# Patient Record
Sex: MALE | Race: WHITE | NOT HISPANIC OR LATINO | Employment: OTHER | URBAN - METROPOLITAN AREA
[De-identification: names, ages, dates, MRNs, and addresses within clinical notes are randomized per-mention and may not be internally consistent; named-entity substitution may affect disease eponyms.]

---

## 2017-06-21 PROBLEM — M54.41 BILATERAL LOW BACK PAIN WITH BILATERAL SCIATICA: Status: ACTIVE | Noted: 2017-06-21

## 2017-06-21 PROBLEM — M51.26 LUMBAR DISC HERNIATION: Status: ACTIVE | Noted: 2017-06-21

## 2017-06-21 PROBLEM — M54.42 BILATERAL LOW BACK PAIN WITH BILATERAL SCIATICA: Status: ACTIVE | Noted: 2017-06-21

## 2017-12-28 DIAGNOSIS — G43.019 INTRACTABLE MIGRAINE WITHOUT AURA AND WITHOUT STATUS MIGRAINOSUS: ICD-10-CM

## 2017-12-28 RX ORDER — SUMATRIPTAN 100 MG/1
TABLET, FILM COATED ORAL
Qty: 9 TAB | Refills: 11 | Status: SHIPPED | OUTPATIENT
Start: 2017-12-28 | End: 2018-03-16 | Stop reason: SDUPTHER

## 2018-01-04 DIAGNOSIS — G43.019 INTRACTABLE MIGRAINE WITHOUT AURA AND WITHOUT STATUS MIGRAINOSUS: ICD-10-CM

## 2018-01-04 RX ORDER — PROPRANOLOL HCL 60 MG
60 CAPSULE, EXTENDED RELEASE 24HR ORAL 2 TIMES DAILY
Qty: 60 CAP | Refills: 11 | Status: SHIPPED | OUTPATIENT
Start: 2018-01-04 | End: 2018-03-16 | Stop reason: SDUPTHER

## 2018-01-04 NOTE — TELEPHONE ENCOUNTER
Was the patient seen in the last year in this department? No (has appt kade'd in March)    Does patient have an active prescription for medications requested? Yes     Received Request Via: Pharmacy

## 2018-03-16 ENCOUNTER — OFFICE VISIT (OUTPATIENT)
Dept: NEUROLOGY | Facility: MEDICAL CENTER | Age: 33
End: 2018-03-16
Payer: COMMERCIAL

## 2018-03-16 VITALS
WEIGHT: 154.65 LBS | RESPIRATION RATE: 15 BRPM | TEMPERATURE: 96.6 F | DIASTOLIC BLOOD PRESSURE: 58 MMHG | BODY MASS INDEX: 22.14 KG/M2 | HEART RATE: 57 BPM | SYSTOLIC BLOOD PRESSURE: 100 MMHG | HEIGHT: 70 IN | OXYGEN SATURATION: 100 %

## 2018-03-16 DIAGNOSIS — G43.019 INTRACTABLE MIGRAINE WITHOUT AURA AND WITHOUT STATUS MIGRAINOSUS: Primary | ICD-10-CM

## 2018-03-16 PROCEDURE — 99213 OFFICE O/P EST LOW 20 MIN: CPT | Performed by: PSYCHIATRY & NEUROLOGY

## 2018-03-16 RX ORDER — PROPRANOLOL HCL 60 MG
60 CAPSULE, EXTENDED RELEASE 24HR ORAL DAILY
Qty: 90 CAP | Refills: 3 | Status: SHIPPED | OUTPATIENT
Start: 2018-03-16 | End: 2019-04-26 | Stop reason: SDUPTHER

## 2018-03-16 RX ORDER — SUMATRIPTAN 100 MG/1
TABLET, FILM COATED ORAL
Qty: 9 TAB | Refills: 11 | Status: SHIPPED | OUTPATIENT
Start: 2018-03-16 | End: 2019-06-19 | Stop reason: SDUPTHER

## 2018-03-16 RX ORDER — CHLORAL HYDRATE 500 MG
1000 CAPSULE ORAL
COMMUNITY
End: 2019-06-19

## 2018-03-16 RX ORDER — METOCLOPRAMIDE 10 MG/1
TABLET ORAL
Qty: 45 TAB | Refills: 1 | Status: SHIPPED | OUTPATIENT
Start: 2018-03-16 | End: 2019-06-19 | Stop reason: SDUPTHER

## 2018-03-16 ASSESSMENT — ENCOUNTER SYMPTOMS
HEADACHES: 0
WEIGHT LOSS: 1

## 2018-03-16 ASSESSMENT — PATIENT HEALTH QUESTIONNAIRE - PHQ9: CLINICAL INTERPRETATION OF PHQ2 SCORE: 0

## 2018-03-16 ASSESSMENT — PAIN SCALES - GENERAL: PAINLEVEL: NO PAIN

## 2018-03-16 NOTE — PROGRESS NOTES
"Subjective:      Mesfin Beth is a 33 y.o. male who presents for follow-up, with a history of migraine without aura.     HPI    Amazingly, Mesfin's headaches have improved even further than when last seen. This was triggered when he was told that his lumbar spine may require surgery. She had undergone a rather significant rehabilitation following an acute injury, imaging revealing a degenerative process at L5/S1. He was given the option of changing his lifestyle, losing weight, etc., or undergoing surgery with fusion. He opted for the former, lost the weight, is more active, and in fact the headaches have improved during the same time.    On Inderal LA 60 mg, twice a day, he went to a daily dose for one month, then off completely, but the headaches then began to increase within less than a week. He is back on Inderal LA 60 mg daily over the last 3 months, he has reached for Imitrex rescue maybe 4 times in total, he uses Reglan even less frequently. Milder headaches are treated with Aleve OTC with good benefit. In general he feels much better. He tolerates the propranolol without issues of weight gain, loss of endurance, edema, etc.    Medical, surgical and family histories are reviewed, there are no new drug allergies. 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 He is quite upbeat about this. He is on Inderal LA 60 mg daily, Imitrex 100 mg, often using either a 25 mg or 50 mg dose, Reglan 10 mg when necessary, Aleve OTC when necessary, the rest as per the electronic health record, noncontributory from my standpoint.    Review of Systems   Constitutional: Positive for weight loss. Negative for malaise/fatigue.   Cardiovascular: Negative for leg swelling.   Neurological: Negative for headaches.   All other systems reviewed and are negative.       Objective:     /58   Pulse (!) 57   Temp 35.9 °C (96.6 °F)   Resp 15   Ht 1.778 m (5' 10\")   Wt 70.1 kg (154 lb 10.4 oz)  "  SpO2 100%   BMI 22.19 kg/m²      Physical Exam    He appears in no acute distress, vital signs are stable though his pulse is still low at a sinus bradycardia of 57. There is no malar rash, temporal jaw tenderness, or jaw claudication. Carotid pulses are present bilaterally without asymmetry. There is no lower extremity edema.    Including mental status, cranial nerves, motor, reflexes, coordination, and sensory evaluations, the examination is fully intact.     Assessment/Plan:     1. Intractable migraine without aura and without status migrainosus  Things are doing very well, we will continue him on Inderal LA 60 mg daily for now. To the future, he can always try to cut the drug out completely, but I recommended he try to stick it out for more than just one week to see if things settle down spontaneously. Taking the drug long-term is certainly safe to do. He will use the Imitrex 100 mg tablets, cutting them into fractions allowing him to use a 25 mg or 50 mg dosing instead. We will follow-up in one year otherwise. Phone contact in the interim if needed.     propranolol LA (INDERAL LA) 60 MG CAPSULE SR 24 HR; Take 1 Cap by mouth every day.  Dispense: 90 Cap; Refill: 3  - sumatriptan (IMITREX) 100 MG tablet; 1 tab at headache onset; repeat in 1 hour prn  Dispense: 9 Tab; Refill: 11  - metoclopramide (REGLAN) 10 MG Tab; 1-3 tab at headache/nausea onset; repeat in 4-6 hours prn  Dispense: 45 Tab; Refill: 1    Time: Evaluation of 20 minutes for exam: Review, discussion, and education  Discussion: As mentioned in the assessment, over 60% of the time spent face-to-face counseling and coordinating care

## 2018-05-21 ENCOUNTER — OFFICE VISIT (OUTPATIENT)
Dept: URGENT CARE | Facility: CLINIC | Age: 33
End: 2018-05-21
Payer: COMMERCIAL

## 2018-05-21 VITALS
DIASTOLIC BLOOD PRESSURE: 72 MMHG | RESPIRATION RATE: 18 BRPM | TEMPERATURE: 98.2 F | WEIGHT: 159 LBS | HEIGHT: 70 IN | SYSTOLIC BLOOD PRESSURE: 120 MMHG | HEART RATE: 58 BPM | BODY MASS INDEX: 22.76 KG/M2 | OXYGEN SATURATION: 96 %

## 2018-05-21 DIAGNOSIS — R19.7 DIARRHEA, UNSPECIFIED TYPE: ICD-10-CM

## 2018-05-21 DIAGNOSIS — R10.9 ABDOMINAL DISCOMFORT: ICD-10-CM

## 2018-05-21 DIAGNOSIS — K92.1 FRANK BLOOD IN STOOL: ICD-10-CM

## 2018-05-21 PROCEDURE — 99204 OFFICE O/P NEW MOD 45 MIN: CPT | Performed by: NURSE PRACTITIONER

## 2018-05-21 ASSESSMENT — ENCOUNTER SYMPTOMS
NAUSEA: 1
VOMITING: 0
DIARRHEA: 1
CONSTIPATION: 0
FEVER: 0
FLATUS: 0
ABDOMINAL PAIN: 1

## 2018-05-21 ASSESSMENT — CROHNS DISEASE ACTIVITY INDEX (CDAI): CDAI SCORE: 0

## 2018-05-21 NOTE — PROGRESS NOTES
Subjective:      Mesfin Beth is a 33 y.o. male who presents with Abdominal Pain (Yesterday nauseas , vomitng, diarrhea and abdominal pain , today rectal bleeding .)            Abdominal Pain   This is a new problem. Episode onset: pt reports he developed sudden onset of nausea and upset stomach early morning yesterday. He admits he woke suddenly with upset stomach, nausea and diarrhea. States he did not feel very hungry yesterday or this morning. The onset quality is sudden. The pain is located in the generalized abdominal region. The pain is mild. Associated symptoms include diarrhea and nausea. Pertinent negatives include no constipation, fever, flatus or vomiting. Associated symptoms comments: Pt reports today he had another episode of diarrhea but this diarrhea had what looked to be a large amount of bright red blood. He became very concerned when he saw all the blood. Describes the abdominal pain as generalized discomfort, denies any local area of pain. Nothing aggravates the pain. He has tried nothing for the symptoms. There is no history of Crohn's disease, irritable bowel syndrome or ulcerative colitis.       Review of Systems   Constitutional: Negative for fever.   Gastrointestinal: Positive for abdominal pain, diarrhea and nausea. Negative for constipation, flatus and vomiting.   All other systems reviewed and are negative.    Past Medical History:   Diagnosis Date   • Head ache    • Migraine without aura, with intractable migraine, so stated, without mention of status migrainosus       Past Surgical History:   Procedure Laterality Date   • APPENDECTOMY      2008      Social History     Social History   • Marital status:      Spouse name: N/A   • Number of children: N/A   • Years of education: N/A     Occupational History   • Not on file.     Social History Main Topics   • Smoking status: Former Smoker     Years: 2.00     Types: Cigarettes     Quit date: 1/1/2005   • Smokeless tobacco: Never Used  "  • Alcohol use 0.5 oz/week     1 Glasses of wine per week      Comment: 1 drink per week, wine usually   • Drug use: No   • Sexual activity: Yes     Partners: Female     Other Topics Concern   • Not on file     Social History Narrative   • No narrative on file          Objective:     /72   Pulse (!) 58   Temp 36.8 °C (98.2 °F)   Resp 18   Ht 1.778 m (5' 10\")   Wt 72.1 kg (159 lb)   SpO2 96%   BMI 22.81 kg/m²      Physical Exam   Constitutional: He is oriented to person, place, and time. Vital signs are normal. He appears well-developed and well-nourished.   HENT:   Head: Normocephalic and atraumatic.   Eyes: EOM are normal. Pupils are equal, round, and reactive to light.   Neck: Normal range of motion.   Cardiovascular: Normal rate and regular rhythm.    Pulmonary/Chest: Effort normal.   Abdominal: Soft. Normal appearance and bowel sounds are normal. He exhibits no distension. There is no tenderness. There is no rigidity, no rebound and no guarding.   Musculoskeletal: Normal range of motion.   Neurological: He is alert and oriented to person, place, and time.   Skin: Skin is warm and dry. Capillary refill takes less than 2 seconds.   Psychiatric: He has a normal mood and affect. His behavior is normal. Thought content normal.   Vitals reviewed.              Assessment/Plan:     1. Diarrhea, unspecified type    2. Braulio blood in stool    3. Abdominal discomfort    Discussed with patient the source of his diarrhea could be from what he ate the first night of symptoms or viral in nature.   Vitals are stable, non toxic appearing  The source of blood could be from internal hemorrhoid(s) or colonic inflammation due to diarrhea. Since he has had only one episode of diarrhea with blood, I am encouraging him to go home and start on a clear liquid diet to help reduce inflammation in his intestinal tract and improve diarrhea, likely stop bleeding. The presence of blood seems unlikely that it is stemming from " ulcerative colitis, diverticulitis or potentially colonic cancer. These are discussed with patient and family they understand  Advised to monitor stool and assess for continued presence of blood  If blood produced remains copious or if he develops abdominal pain, he has strict instructions to go to the ED  All questions answered, encouraged to follow up with PCP in one week  Supportive care, differential diagnoses, and indications for immediate follow-up discussed with patient.    Pathogenesis of diagnosis discussed including typical length and natural progression.      Instructed to return to UC or nearest emergency department if symptoms fail to improve, for any change in condition, further concerns, or new concerning symptoms.  Patient states understanding of the plan of care and discharge instructions.

## 2018-06-27 ENCOUNTER — HOSPITAL ENCOUNTER (OUTPATIENT)
Dept: LAB | Facility: MEDICAL CENTER | Age: 33
End: 2018-06-27
Payer: COMMERCIAL

## 2018-06-27 LAB
BDY FAT % MEASURED: 8.4 %
BP DIAS: 69 MMHG
BP SYS: 125 MMHG
CHOLEST SERPL-MCNC: 208 MG/DL (ref 100–199)
DIABETES HTDIA: NO
EVENT NAME HTEVT: NORMAL
FASTING HTFAS: YES
GLUCOSE SERPL-MCNC: 77 MG/DL (ref 65–99)
HDLC SERPL-MCNC: 57 MG/DL
HYPERTENSION HTHYP: NO
LDLC SERPL CALC-MCNC: 132 MG/DL
SCREENING LOC CITY HTCIT: NORMAL
SCREENING LOC STATE HTSTA: NORMAL
SCREENING LOCATION HTLOC: NORMAL
SMOKING HTSMO: NO
SUBSCRIBER ID HTSID: NORMAL
TRIGL SERPL-MCNC: 93 MG/DL (ref 0–149)

## 2018-06-27 PROCEDURE — 80061 LIPID PANEL: CPT

## 2018-06-27 PROCEDURE — 82947 ASSAY GLUCOSE BLOOD QUANT: CPT

## 2018-06-27 PROCEDURE — S5190 WELLNESS ASSESSMENT BY NONPH: HCPCS

## 2018-06-27 PROCEDURE — 36415 COLL VENOUS BLD VENIPUNCTURE: CPT

## 2019-03-18 ENCOUNTER — APPOINTMENT (OUTPATIENT)
Dept: NEUROLOGY | Facility: MEDICAL CENTER | Age: 34
End: 2019-03-18
Payer: COMMERCIAL

## 2019-04-26 DIAGNOSIS — G43.019 INTRACTABLE MIGRAINE WITHOUT AURA AND WITHOUT STATUS MIGRAINOSUS: ICD-10-CM

## 2019-04-26 RX ORDER — PROPRANOLOL HCL 60 MG
60 CAPSULE, EXTENDED RELEASE 24HR ORAL DAILY
Qty: 90 CAP | Refills: 3 | Status: SHIPPED | OUTPATIENT
Start: 2019-04-26 | End: 2020-05-28 | Stop reason: SDUPTHER

## 2019-06-19 ENCOUNTER — OFFICE VISIT (OUTPATIENT)
Dept: NEUROLOGY | Facility: MEDICAL CENTER | Age: 34
End: 2019-06-19
Payer: COMMERCIAL

## 2019-06-19 VITALS
RESPIRATION RATE: 15 BRPM | TEMPERATURE: 97.2 F | OXYGEN SATURATION: 98 % | SYSTOLIC BLOOD PRESSURE: 130 MMHG | DIASTOLIC BLOOD PRESSURE: 78 MMHG | HEIGHT: 70 IN | WEIGHT: 169 LBS | BODY MASS INDEX: 24.2 KG/M2 | HEART RATE: 69 BPM

## 2019-06-19 DIAGNOSIS — G43.019 INTRACTABLE MIGRAINE WITHOUT AURA AND WITHOUT STATUS MIGRAINOSUS: Primary | ICD-10-CM

## 2019-06-19 PROCEDURE — 99213 OFFICE O/P EST LOW 20 MIN: CPT | Performed by: PSYCHIATRY & NEUROLOGY

## 2019-06-19 RX ORDER — METOCLOPRAMIDE 10 MG/1
TABLET ORAL
Qty: 45 TAB | Refills: 1 | Status: SHIPPED | OUTPATIENT
Start: 2019-06-19 | End: 2020-05-28 | Stop reason: SDUPTHER

## 2019-06-19 RX ORDER — SUMATRIPTAN 100 MG/1
TABLET, FILM COATED ORAL
Qty: 9 TAB | Refills: 11 | Status: SHIPPED | OUTPATIENT
Start: 2019-06-19 | End: 2020-05-28 | Stop reason: SDUPTHER

## 2019-06-19 ASSESSMENT — PAIN SCALES - GENERAL: PAINLEVEL: NO PAIN

## 2019-06-19 ASSESSMENT — ENCOUNTER SYMPTOMS
TINGLING: 0
FOCAL WEAKNESS: 0
TREMORS: 0
BACK PAIN: 1

## 2019-06-19 ASSESSMENT — PATIENT HEALTH QUESTIONNAIRE - PHQ9: CLINICAL INTERPRETATION OF PHQ2 SCORE: 0

## 2019-06-19 NOTE — PROGRESS NOTES
"Subjective:      Mesfin Beth is a 34 y.o. male who presents for follow-up, with a history of migraine without aura.     HPI    Still on Inderal LA 60 mg daily, he has a migraine attack once a month.  Milder headaches might occur more frequently in the mornings, a simple cup of coffee does the trick.  The headaches are still responsive to the Imitrex 100 mg tablets, occasional use with Reglan 10 mg.    The Inderal is well-tolerated, he has had no problems with lowered endurance, edema, dizziness or syncope.  He is asking again about possibly getting off the Inderal to see if things get worse, as it had in the past when he tried.    The low back pain issues have stabilized, he did undergo surgery and is simply limited because of exercise type that he must avoid, specifically running.  He is otherwise quite physically active with low impact aerobics and weightlifting.    Medical, surgical and family histories are reviewed in the electronic health record, there are no new drug allergies.  He is on Inderal LA 60 mg daily, Imitrex 100 mg/Reglan 10 mg as needed, and has Flexeril 10 mg as needed.    Review of Systems   Cardiovascular: Negative for leg swelling.   Musculoskeletal: Positive for back pain.   Neurological: Negative for tingling, tremors and focal weakness.   All other systems reviewed and are negative.       Objective:     /78 (BP Location: Right arm, Patient Position: Sitting)   Pulse 69   Temp 36.2 °C (97.2 °F) (Temporal)   Resp 15   Ht 1.778 m (5' 10\")   Wt 76.7 kg (169 lb)   SpO2 98%   BMI 24.25 kg/m²      Physical Exam    He appears in no acute distress.  His vital signs are stable.  There is no malar rash or jaw claudication.  The neck is supple, range of motion is full.  Cardiac evaluation is unremarkable.  There is no lower extremity edema.    Including mental status, cranial nerve, musculoskeletal and coordination evaluations, the examination reveals no evidence of deficits nor " toxicities.     Assessment/Plan:     1. Intractable migraine without aura and without status migrainosus  Stabilizing nicely, though I suspect his headaches will get worse again off Inderal, I did welcome him to try.  Though he could attempt simply discontinuing the drug, he did so in the past without rebound tachycardia, he will go every other day for several weeks, allowing enough time to transpire, given his headache frequency is now once a month.  If things are stable, he stays off the drug, if the headaches increase, he gets back on.  He is comfortable with this.  I again reassured him that taking the drug long-term is safe to do.  We will follow-up in 1 year.    - sumatriptan (IMITREX) 100 MG tablet; 1 tab at headache onset; repeat in 1 hour prn  Dispense: 9 Tab; Refill: 11  - metoclopramide (REGLAN) 10 MG Tab; 1-3 tab at headache/nausea onset; repeat in 4-6 hours prn  Dispense: 45 Tab; Refill: 1    Time: 20 minutes spent face-to-face for exam, review, discussion, and education, of this over 50% of the time spent counseling and coordinating care.

## 2019-08-02 ENCOUNTER — HOSPITAL ENCOUNTER (OUTPATIENT)
Dept: LAB | Facility: MEDICAL CENTER | Age: 34
End: 2019-08-02
Payer: COMMERCIAL

## 2019-08-02 LAB
BDY FAT % MEASURED: 14.1 %
BP DIAS: 79 MMHG
BP SYS: 127 MMHG
CHOLEST SERPL-MCNC: 203 MG/DL (ref 100–199)
DIABETES HTDIA: NO
EVENT NAME HTEVT: NORMAL
FASTING HTFAS: YES
GLUCOSE SERPL-MCNC: 100 MG/DL (ref 65–99)
HDLC SERPL-MCNC: 51 MG/DL
HYPERTENSION HTHYP: NO
LDLC SERPL CALC-MCNC: 131 MG/DL
SCREENING LOC CITY HTCIT: NORMAL
SCREENING LOC STATE HTSTA: NORMAL
SCREENING LOCATION HTLOC: NORMAL
SMOKING HTSMO: NO
SUBSCRIBER ID HTSID: NORMAL
TRIGL SERPL-MCNC: 103 MG/DL (ref 0–149)

## 2019-08-02 PROCEDURE — 82947 ASSAY GLUCOSE BLOOD QUANT: CPT

## 2019-08-02 PROCEDURE — 80061 LIPID PANEL: CPT

## 2019-08-02 PROCEDURE — 36415 COLL VENOUS BLD VENIPUNCTURE: CPT

## 2019-08-02 PROCEDURE — S5190 WELLNESS ASSESSMENT BY NONPH: HCPCS

## 2019-10-19 ENCOUNTER — NON-PROVIDER VISIT (OUTPATIENT)
Dept: URGENT CARE | Facility: CLINIC | Age: 34
End: 2019-10-19
Payer: COMMERCIAL

## 2019-10-19 DIAGNOSIS — Z23 NEED FOR INFLUENZA VACCINATION: ICD-10-CM

## 2019-10-19 PROCEDURE — 90686 IIV4 VACC NO PRSV 0.5 ML IM: CPT | Performed by: FAMILY MEDICINE

## 2019-10-19 PROCEDURE — 90471 IMMUNIZATION ADMIN: CPT | Performed by: FAMILY MEDICINE

## 2020-05-28 ENCOUNTER — OFFICE VISIT (OUTPATIENT)
Dept: NEUROLOGY | Facility: MEDICAL CENTER | Age: 35
End: 2020-05-28
Payer: COMMERCIAL

## 2020-05-28 VITALS
HEIGHT: 70 IN | TEMPERATURE: 98.2 F | RESPIRATION RATE: 18 BRPM | DIASTOLIC BLOOD PRESSURE: 78 MMHG | HEART RATE: 64 BPM | BODY MASS INDEX: 23.19 KG/M2 | SYSTOLIC BLOOD PRESSURE: 126 MMHG | WEIGHT: 162 LBS | OXYGEN SATURATION: 99 %

## 2020-05-28 DIAGNOSIS — G43.019 INTRACTABLE MIGRAINE WITHOUT AURA AND WITHOUT STATUS MIGRAINOSUS: Primary | ICD-10-CM

## 2020-05-28 PROCEDURE — 99213 OFFICE O/P EST LOW 20 MIN: CPT | Performed by: PSYCHIATRY & NEUROLOGY

## 2020-05-28 RX ORDER — SUMATRIPTAN 100 MG/1
TABLET, FILM COATED ORAL
Qty: 9 TAB | Refills: 11 | Status: SHIPPED | OUTPATIENT
Start: 2020-05-28 | End: 2022-04-01

## 2020-05-28 RX ORDER — METOCLOPRAMIDE 10 MG/1
TABLET ORAL
Qty: 45 TAB | Refills: 1 | Status: SHIPPED | OUTPATIENT
Start: 2020-05-28

## 2020-05-28 RX ORDER — PROPRANOLOL HCL 60 MG
60 CAPSULE, EXTENDED RELEASE 24HR ORAL DAILY
Qty: 90 CAP | Refills: 3 | Status: SHIPPED | OUTPATIENT
Start: 2020-05-28 | End: 2021-05-17

## 2020-05-28 ASSESSMENT — ENCOUNTER SYMPTOMS
HEADACHES: 1
MEMORY LOSS: 0

## 2020-05-28 ASSESSMENT — PATIENT HEALTH QUESTIONNAIRE - PHQ9: CLINICAL INTERPRETATION OF PHQ2 SCORE: 0

## 2020-05-28 NOTE — PROGRESS NOTES
Subjective:      Mesfin Beth is a 35 y.o. male who presents for routine annual follow-up, with a history of migraine without aura.    HPI    Over the last year, Mesfin states the headaches have remained well controlled.  He will still get a breakthrough event once or twice in a month, Imitrex 100 mg with Reglan 10 mg always does the trick on a consistent basis.  He was still get the occasional milder headaches in the mornings for which a cup of coffee does the trick.  There does not seem to be a relationship with these occurring greater frequency or severity leading up to a migraine.    We had discussed trying again, so when last seen, he stopped Inderal LA 60 mg cold turkey.  He had done this in the past without rebound, but this time it may have been a problem.  He felt horrible, described malaise and fatigue, but also the headaches came back.  He is back on the drug.  He is always tolerated the drug, he simply does not like taking pills.  Historically, he had been on a 120 mg dose, was able to get down to 60 without issue, but repeated attempts at doing further never went anywhere.    Medical, surgical and family history is reviewed, there are no new drug allergies.  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  He is slowly pushing this product though with COVID-19, closure of bars and liquor stores, cells are on hold.  Jessa, his wife, is doing well as a nurse.  He is on Inderal LA 60 mg daily, Reglan 10 mg/Imitrex 100 mg as needed, and some vitamin and mineral supplements with probiotics.    Review of Systems   Neurological: Positive for headaches.   Psychiatric/Behavioral: Negative for memory loss.   All other systems reviewed and are negative.        Objective:     /78 (BP Location: Left arm, Patient Position: Sitting, BP Cuff Size: Adult)   Pulse 64   Temp 36.8 °C (98.2 °F) (Temporal)   Resp 18   Ht  "1.778 m (5' 10\")   Wt 73.5 kg (162 lb)   SpO2 99%   BMI 23.24 kg/m²      Physical Exam    He appears in no acute distress.  Vital signs are stable.  There is no malar rash, jaw claudication, or allodynia.  The neck is supple, range of motion is full.  Cardiac evaluation is unremarkable.  There is no lower extremity edema.    Including mental status, cranial nerves, musculoskeletal, reflex, coordination, and since evaluations, full neurologic examination is done reveals no evidence of deficits or toxicities.    Assessment/Plan:     1. Intractable migraine without aura and without status migrainosus  He will likely need to stay on Inderal LA 60 mg dose for quite some time, but if he tries again, I recommended going more slowly and incrementally which may allow a reduction in overall dose before headaches actually recur.  When he is ready, I recommended going to Inderal 20 mg tablets, 1 tablet twice daily for 2 weeks, then 1 tablet daily for another 2 weeks, eventually stopping.  The rationale for this was reviewed.  In the interim, he will maintain his present regimen getting through this pandemic and allowing things to stabilize vis-a-vis his personal business.  We will follow-up in 1 year.    - propranolol LA (INDERAL LA) 60 MG CAPSULE SR 24 HR; Take 1 Cap by mouth every day.  Dispense: 90 Cap; Refill: 3  - sumatriptan (IMITREX) 100 MG tablet; 1 tab at headache onset; repeat in 1 hour prn  Dispense: 9 Tab; Refill: 11  - metoclopramide (REGLAN) 10 MG Tab; 1-3 tab at headache/nausea onset; repeat in 4-6 hours prn  Dispense: 45 Tab; Refill: 1    Time: 20-minute spent face-to-face for exam, review, discussion, and education, of this over 50% of the time spent counseling and coordinating care.  "

## 2020-08-07 ENCOUNTER — HOSPITAL ENCOUNTER (OUTPATIENT)
Dept: LAB | Facility: MEDICAL CENTER | Age: 35
End: 2020-08-07
Payer: COMMERCIAL

## 2020-08-07 LAB
BDY FAT % MEASURED: 19.2 %
BP DIAS: 84 MMHG
BP SYS: 122 MMHG
CHOLEST SERPL-MCNC: 211 MG/DL (ref 100–199)
DIABETES HTDIA: NO
EVENT NAME HTEVT: NORMAL
FASTING HTFAS: YES
GLUCOSE SERPL-MCNC: 93 MG/DL (ref 65–99)
HDLC SERPL-MCNC: 59 MG/DL
HYPERTENSION HTHYP: NO
LDLC SERPL CALC-MCNC: 131 MG/DL
SCREENING LOC CITY HTCIT: NORMAL
SCREENING LOC STATE HTSTA: NORMAL
SCREENING LOCATION HTLOC: NORMAL
SMOKING HTSMO: NO
SUBSCRIBER ID HTSID: NORMAL
TRIGL SERPL-MCNC: 107 MG/DL (ref 0–149)

## 2020-08-07 PROCEDURE — 36415 COLL VENOUS BLD VENIPUNCTURE: CPT

## 2020-08-07 PROCEDURE — 80061 LIPID PANEL: CPT

## 2020-08-07 PROCEDURE — S5190 WELLNESS ASSESSMENT BY NONPH: HCPCS

## 2020-08-07 PROCEDURE — 82947 ASSAY GLUCOSE BLOOD QUANT: CPT

## 2021-05-16 DIAGNOSIS — G43.019 INTRACTABLE MIGRAINE WITHOUT AURA AND WITHOUT STATUS MIGRAINOSUS: ICD-10-CM

## 2021-05-17 RX ORDER — PROPRANOLOL HCL 60 MG
CAPSULE, EXTENDED RELEASE 24HR ORAL
Qty: 90 CAPSULE | Refills: 3 | Status: SHIPPED | OUTPATIENT
Start: 2021-05-17 | End: 2022-05-09

## 2022-04-01 ENCOUNTER — OFFICE VISIT (OUTPATIENT)
Dept: NEUROLOGY | Facility: MEDICAL CENTER | Age: 37
End: 2022-04-01
Attending: PSYCHIATRY & NEUROLOGY
Payer: COMMERCIAL

## 2022-04-01 VITALS
DIASTOLIC BLOOD PRESSURE: 90 MMHG | BODY MASS INDEX: 26.51 KG/M2 | OXYGEN SATURATION: 96 % | HEIGHT: 70 IN | HEART RATE: 68 BPM | TEMPERATURE: 97.6 F | WEIGHT: 185.19 LBS | SYSTOLIC BLOOD PRESSURE: 120 MMHG

## 2022-04-01 DIAGNOSIS — G43.019 INTRACTABLE MIGRAINE WITHOUT AURA AND WITHOUT STATUS MIGRAINOSUS: Primary | ICD-10-CM

## 2022-04-01 PROCEDURE — 99213 OFFICE O/P EST LOW 20 MIN: CPT | Performed by: PSYCHIATRY & NEUROLOGY

## 2022-04-01 PROCEDURE — 99212 OFFICE O/P EST SF 10 MIN: CPT | Performed by: PSYCHIATRY & NEUROLOGY

## 2022-04-01 RX ORDER — SUMATRIPTAN 50 MG/1
TABLET, FILM COATED ORAL
Qty: 9 TABLET | Refills: 0 | Status: SHIPPED | OUTPATIENT
Start: 2022-04-01 | End: 2022-05-09

## 2022-04-01 ASSESSMENT — PATIENT HEALTH QUESTIONNAIRE - PHQ9: CLINICAL INTERPRETATION OF PHQ2 SCORE: 0

## 2022-04-02 ASSESSMENT — ENCOUNTER SYMPTOMS
HEADACHES: 1
NECK PAIN: 1

## 2022-04-02 NOTE — PROGRESS NOTES
"Subjective     Mesfin Beth is a 37 y.o. male who presents for follow-up, with a history of migraine without aura.    HPI    Since last seen, Mesfin states that the headaches have increased somewhat.  This started back in September, 2021, after he went through a rather rough course with Covid-19.  Prior to that he was having a headache maybe once in a month if not less, now he is getting up 4 times in a month.  To begin mostly with increasing neck stiffness, using Imitrex 100 mg does take everything away consistently.  Is simply having to reach for the drug little more often.  The 100 mg dose does increase some of his neck stiffness that comes with the headaches, though it does work.  He is wondering if a lower dose might be effective without the side effect.  He has remained on Inderal LA 60 mg daily.    Medical, surgical and family histories are reviewed, there are no new drug allergies.  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  He is on Inderal LA 60 mg daily, Imitrex 100 mg as needed, Reglan 10 mg as needed, Flexeril 10 mg 3 times daily as needed, along with vitamin C and D supplements.    Review of Systems   Musculoskeletal: Positive for neck pain.   Neurological: Positive for headaches.   All other systems reviewed and are negative.    Objective     /90 (BP Location: Right arm, Patient Position: Sitting, BP Cuff Size: Adult)   Pulse 68   Temp 36.4 °C (97.6 °F) (Temporal)   Ht 1.778 m (5' 10\")   Wt 84 kg (185 lb 3 oz)   SpO2 96%   BMI 26.57 kg/m²      Physical Exam    He appears in no acute distress.  His vital signs are stable.  Blood pressure is slightly elevated at 120/90.  There is no malar rash or jaw claudication.  His neck is supple.  Cardiac evaluation reveals a regular rhythm.  There is no lower extremity edema.     Neurological Exam    Including mental status, cranial nerve, musculoskeletal and coordination valuations, his exam remains intact.    Assessment & Plan "     1. Intractable migraine without aura and without status migrainosus  For now we maintain the course on Inderal LA 60 mg daily.  He needs the drug, higher doses were never tolerated.  I will give him an Imitrex 50 mg tablets to try instead of the 100 mg tablets.  He will let us know how he is doing with the lower dose in terms of efficacy and tolerability.  Though we talked about adding another maintenance therapy, including the CGRP group, he would like to hold off for now.  We will see each other in 6 months.    - SUMAtriptan (IMITREX) 50 MG Tab; 1 tab at headache onset; repeat in 1 hour prn  Dispense: 9 Tablet; Refill: 0    Time: 20 minutes in total spent on patient care including free charting, record review, discussion with healthcare staff and documentation.  This includes face-to-face time with the patient for exam, review, discussion, as well as counseling and coordinating care.

## 2022-05-08 DIAGNOSIS — G43.019 INTRACTABLE MIGRAINE WITHOUT AURA AND WITHOUT STATUS MIGRAINOSUS: ICD-10-CM

## 2022-05-09 RX ORDER — PROPRANOLOL HCL 60 MG
CAPSULE, EXTENDED RELEASE 24HR ORAL
Qty: 90 CAPSULE | Refills: 3 | Status: SHIPPED | OUTPATIENT
Start: 2022-05-09 | End: 2023-05-23

## 2022-05-09 NOTE — TELEPHONE ENCOUNTER
Received request via: Pharmacy    Was the patient seen in the last year in this department? Yes    LV: 4/1/22    Does the patient have an active prescription (recently filled or refills available) for medication(s) requested? Yes.

## 2022-11-04 DIAGNOSIS — G43.019 INTRACTABLE MIGRAINE WITHOUT AURA AND WITHOUT STATUS MIGRAINOSUS: ICD-10-CM

## 2022-11-04 RX ORDER — SUMATRIPTAN 50 MG/1
TABLET, FILM COATED ORAL
Qty: 9 TABLET | Refills: 5 | Status: SHIPPED | OUTPATIENT
Start: 2022-11-04 | End: 2023-08-14

## 2023-04-06 SDOH — ECONOMIC STABILITY: INCOME INSECURITY: IN THE LAST 12 MONTHS, WAS THERE A TIME WHEN YOU WERE NOT ABLE TO PAY THE MORTGAGE OR RENT ON TIME?: NO

## 2023-04-06 SDOH — ECONOMIC STABILITY: HOUSING INSECURITY
IN THE LAST 12 MONTHS, WAS THERE A TIME WHEN YOU DID NOT HAVE A STEADY PLACE TO SLEEP OR SLEPT IN A SHELTER (INCLUDING NOW)?: NO

## 2023-04-06 SDOH — ECONOMIC STABILITY: TRANSPORTATION INSECURITY
IN THE PAST 12 MONTHS, HAS LACK OF TRANSPORTATION KEPT YOU FROM MEETINGS, WORK, OR FROM GETTING THINGS NEEDED FOR DAILY LIVING?: NO

## 2023-04-06 SDOH — ECONOMIC STABILITY: INCOME INSECURITY: HOW HARD IS IT FOR YOU TO PAY FOR THE VERY BASICS LIKE FOOD, HOUSING, MEDICAL CARE, AND HEATING?: NOT HARD AT ALL

## 2023-04-06 SDOH — ECONOMIC STABILITY: FOOD INSECURITY: WITHIN THE PAST 12 MONTHS, YOU WORRIED THAT YOUR FOOD WOULD RUN OUT BEFORE YOU GOT MONEY TO BUY MORE.: NEVER TRUE

## 2023-04-06 SDOH — ECONOMIC STABILITY: HOUSING INSECURITY: IN THE LAST 12 MONTHS, HOW MANY PLACES HAVE YOU LIVED?: 1

## 2023-04-06 SDOH — HEALTH STABILITY: PHYSICAL HEALTH: ON AVERAGE, HOW MANY DAYS PER WEEK DO YOU ENGAGE IN MODERATE TO STRENUOUS EXERCISE (LIKE A BRISK WALK)?: 7 DAYS

## 2023-04-06 SDOH — ECONOMIC STABILITY: TRANSPORTATION INSECURITY
IN THE PAST 12 MONTHS, HAS THE LACK OF TRANSPORTATION KEPT YOU FROM MEDICAL APPOINTMENTS OR FROM GETTING MEDICATIONS?: NO

## 2023-04-06 SDOH — HEALTH STABILITY: PHYSICAL HEALTH: ON AVERAGE, HOW MANY MINUTES DO YOU ENGAGE IN EXERCISE AT THIS LEVEL?: 30 MIN

## 2023-04-06 SDOH — ECONOMIC STABILITY: FOOD INSECURITY: WITHIN THE PAST 12 MONTHS, THE FOOD YOU BOUGHT JUST DIDN'T LAST AND YOU DIDN'T HAVE MONEY TO GET MORE.: NEVER TRUE

## 2023-04-06 SDOH — ECONOMIC STABILITY: TRANSPORTATION INSECURITY
IN THE PAST 12 MONTHS, HAS LACK OF RELIABLE TRANSPORTATION KEPT YOU FROM MEDICAL APPOINTMENTS, MEETINGS, WORK OR FROM GETTING THINGS NEEDED FOR DAILY LIVING?: NO

## 2023-04-06 SDOH — HEALTH STABILITY: MENTAL HEALTH
STRESS IS WHEN SOMEONE FEELS TENSE, NERVOUS, ANXIOUS, OR CAN'T SLEEP AT NIGHT BECAUSE THEIR MIND IS TROUBLED. HOW STRESSED ARE YOU?: NOT AT ALL

## 2023-04-06 ASSESSMENT — SOCIAL DETERMINANTS OF HEALTH (SDOH)
WITHIN THE PAST 12 MONTHS, YOU WORRIED THAT YOUR FOOD WOULD RUN OUT BEFORE YOU GOT THE MONEY TO BUY MORE: NEVER TRUE
HOW OFTEN DO YOU HAVE A DRINK CONTAINING ALCOHOL: 2-4 TIMES A MONTH
HOW OFTEN DO YOU ATTENT MEETINGS OF THE CLUB OR ORGANIZATION YOU BELONG TO?: NEVER
HOW HARD IS IT FOR YOU TO PAY FOR THE VERY BASICS LIKE FOOD, HOUSING, MEDICAL CARE, AND HEATING?: NOT HARD AT ALL
HOW OFTEN DO YOU GET TOGETHER WITH FRIENDS OR RELATIVES?: ONCE A WEEK
HOW OFTEN DO YOU GET TOGETHER WITH FRIENDS OR RELATIVES?: ONCE A WEEK
IN A TYPICAL WEEK, HOW MANY TIMES DO YOU TALK ON THE PHONE WITH FAMILY, FRIENDS, OR NEIGHBORS?: MORE THAN THREE TIMES A WEEK
IN A TYPICAL WEEK, HOW MANY TIMES DO YOU TALK ON THE PHONE WITH FAMILY, FRIENDS, OR NEIGHBORS?: MORE THAN THREE TIMES A WEEK
DO YOU BELONG TO ANY CLUBS OR ORGANIZATIONS SUCH AS CHURCH GROUPS UNIONS, FRATERNAL OR ATHLETIC GROUPS, OR SCHOOL GROUPS?: NO
DO YOU BELONG TO ANY CLUBS OR ORGANIZATIONS SUCH AS CHURCH GROUPS UNIONS, FRATERNAL OR ATHLETIC GROUPS, OR SCHOOL GROUPS?: NO
HOW OFTEN DO YOU HAVE SIX OR MORE DRINKS ON ONE OCCASION: NEVER
HOW OFTEN DO YOU ATTEND CHURCH OR RELIGIOUS SERVICES?: 1 TO 4 TIMES PER YEAR
HOW MANY DRINKS CONTAINING ALCOHOL DO YOU HAVE ON A TYPICAL DAY WHEN YOU ARE DRINKING: 1 OR 2
HOW OFTEN DO YOU ATTENT MEETINGS OF THE CLUB OR ORGANIZATION YOU BELONG TO?: NEVER
HOW OFTEN DO YOU ATTEND CHURCH OR RELIGIOUS SERVICES?: 1 TO 4 TIMES PER YEAR

## 2023-04-06 ASSESSMENT — LIFESTYLE VARIABLES
AUDIT-C TOTAL SCORE: 2
HOW OFTEN DO YOU HAVE SIX OR MORE DRINKS ON ONE OCCASION: NEVER
HOW OFTEN DO YOU HAVE A DRINK CONTAINING ALCOHOL: 2-4 TIMES A MONTH
SKIP TO QUESTIONS 9-10: 1
HOW MANY STANDARD DRINKS CONTAINING ALCOHOL DO YOU HAVE ON A TYPICAL DAY: 1 OR 2

## 2023-04-07 ENCOUNTER — OFFICE VISIT (OUTPATIENT)
Dept: MEDICAL GROUP | Facility: PHYSICIAN GROUP | Age: 38
End: 2023-04-07
Payer: COMMERCIAL

## 2023-04-07 VITALS
SYSTOLIC BLOOD PRESSURE: 122 MMHG | DIASTOLIC BLOOD PRESSURE: 80 MMHG | OXYGEN SATURATION: 96 % | TEMPERATURE: 97.4 F | WEIGHT: 166.6 LBS | HEIGHT: 70 IN | BODY MASS INDEX: 23.85 KG/M2 | HEART RATE: 75 BPM | RESPIRATION RATE: 16 BRPM

## 2023-04-07 DIAGNOSIS — M51.26 LUMBAR DISC HERNIATION: ICD-10-CM

## 2023-04-07 DIAGNOSIS — R53.83 OTHER FATIGUE: ICD-10-CM

## 2023-04-07 DIAGNOSIS — Z00.00 PHYSICAL EXAM, ANNUAL: ICD-10-CM

## 2023-04-07 DIAGNOSIS — Z23 NEED FOR VACCINATION: ICD-10-CM

## 2023-04-07 LAB — HBA1C MFR BLD: 5.1 % (ref ?–5.8)

## 2023-04-07 PROCEDURE — 99214 OFFICE O/P EST MOD 30 MIN: CPT | Mod: 25 | Performed by: PHYSICIAN ASSISTANT

## 2023-04-07 PROCEDURE — 90471 IMMUNIZATION ADMIN: CPT | Performed by: PHYSICIAN ASSISTANT

## 2023-04-07 PROCEDURE — 90746 HEPB VACCINE 3 DOSE ADULT IM: CPT | Performed by: PHYSICIAN ASSISTANT

## 2023-04-07 RX ORDER — UBIDECARENONE 75 MG
100 CAPSULE ORAL DAILY
COMMUNITY

## 2023-04-07 ASSESSMENT — PATIENT HEALTH QUESTIONNAIRE - PHQ9: CLINICAL INTERPRETATION OF PHQ2 SCORE: 0

## 2023-04-07 NOTE — PROGRESS NOTES
CC:    Chief Complaint   Patient presents with    [facility]     States has history of migraines     Requesting Labs     Testosterone        HISTORY OF THE PRESENT ILLNESS: Patient is a 38 y.o. male presenting to hospitals [facility]     Pt sees Dr Garcia for migraines. Has been on propranolol for years for headache prophylaxis. Also takes imitrex and reglan for acute migraines.   Pt has hx of lumbar disc herniation. Used to take flexeril.     No problem-specific Assessment & Plan notes found for this encounter.    Allergies: Patient has no known allergies.    Current Outpatient Medications Ordered in Epic   Medication Sig Dispense Refill    cyanocobalamin (VITAMIN B-12) 100 MCG Tab Take 100 mcg by mouth every day.      SUMAtriptan (IMITREX) 50 MG Tab TAKE ONE TABLET BY MOUTH AT THE ONSET OF HEADACHE, REPEAT IN ONE HOUR AS NEEDED 9 Tablet 5    propranolol LA (INDERAL LA) 60 MG CAPSULE SR 24 HR TAKE ONE CAPSULE BY MOUTH EVERY DAY 90 Capsule 3    metoclopramide (REGLAN) 10 MG Tab 1-3 tab at headache/nausea onset; repeat in 4-6 hours prn 45 Tab 1    Cholecalciferol (VITAMIN D-3 PO) Take  by mouth.      cyclobenzaprine (FLEXERIL) 10 MG Tab Take 1 Tab by mouth 3 times a day as needed. 30 Tab 0    Ascorbic Acid (VITAMIN C PO) Take  by mouth. (Patient not taking: Reported on 4/7/2023)      Multiple Vitamin (MULTI-VITAMIN DAILY) TABS Take 1 Tablet by mouth every day.   (Patient not taking: Reported on 4/7/2023)       No current [facility]-ordered facility-administered medications on file.       Past Medical History:   Diagnosis Date    Head ache     Intractable migraine without aura     Chronic type Failed (SE): Topamax (cognitive) Failed (ineffective): Mg, Feverfew, B2   ICD-10 transition       Past Surgical History:   Procedure Laterality Date    APPENDECTOMY      2008       Social History     Tobacco Use    Smoking status: Never    Smokeless tobacco: Never   Vaping Use    Vaping Use: Never used   Substance Use Topics     "Alcohol use: Not Currently     Comment: OCC    Drug use: No       Social History     Social History Narrative    Not on file       No family history on file.    ROS:     - Constitutional: Negative for fever, chills, unexpected weight change,     - HEENT: Negative for headaches, vision changes, hearing changes, ear pain, ear discharge, rhinorrhea, sinus congestion, sore throat, and neck pain.      - Respiratory: Negative for cough, sputum production, chest congestion, dyspnea, wheezing, and crackles.      - Cardiovascular: Negative for chest pain, palpitations, orthopnea, and bilateral lower extremity edema.     - Gastrointestinal: Negative for heartburn, nausea, vomiting, abdominal pain, hematochezia, melena, diarrhea, constipation, and greasy/foul-smelling stools.     - Genitourinary: Negative for dysuria, polyuria, hematuria, pyuria, urinary urgency, and urinary incontinence.     - Musculoskeletal:Positive for back pain.  Negative for myalgias, and joint pain.     - Skin: Negative for rash, itching, cyanotic skin color change.     - Neurological:Positive for headaches. Negative for dizziness, tingling, tremors, focal sensory deficit, focal weakness     - Endo/Heme/Allergies: Does not bruise/bleed easily.     - Psychiatric/Behavioral: Negative for depression, suicidal/homicidal ideation and memory loss.            .      Exam: /80   Pulse 75   Temp 36.3 °C (97.4 °F) (Temporal)   Resp 16   Ht 1.778 m (5' 10\")   Wt 75.6 kg (166 lb 9.6 oz)   SpO2 96%  Body mass index is 23.9 kg/m².    General: Normal appearing. No acute distress.  Skin: Warm and dry.  No obvious lesions.  HEENT: Normocephalic. Eyes conjunctiva clear lids without ptosis, ears normal shape and contour  Cardiovascular: Regular rate and rhythm without murmur.   Respiratory: Clear to auscultation bilaterally, no rhonchi wheezing or rales.  Neurologic: Grossly nonfocal, A&O x3, gait normal,  Musculoskeletal: No deformity or swelling. "   Extremities: No extremity cyanosis, clubbing, or edema.  Psych: Normal mood and affect. Judgment and insight is normal.    Please note that this dictation was created using voice recognition software. I have made every reasonable attempt to correct obvious errors, but I expect that there are errors of grammar and possibly content that I did not discover before finalizing the note.      Assessment/Plan    1. Physical exam, annual  Labs printed.  - TSH WITH REFLEX TO FT4; Future  - Lipid Profile; Future  - HEMOGLOBIN A1C; Future  - Comp Metabolic Panel; Future  - CBC WITH DIFFERENTIAL; Future    2. Other fatigue  Review at next visit.  - Testosterone, Free & Total, Adult Male (w/SHBG); Future    3. Need for vaccination    - Hep B Adult 20+    4. Lumbar disc herniation  Stable

## 2023-05-15 ENCOUNTER — OFFICE VISIT (OUTPATIENT)
Dept: MEDICAL GROUP | Facility: PHYSICIAN GROUP | Age: 38
End: 2023-05-15
Payer: COMMERCIAL

## 2023-05-15 VITALS
HEIGHT: 70 IN | SYSTOLIC BLOOD PRESSURE: 120 MMHG | HEART RATE: 64 BPM | BODY MASS INDEX: 23.77 KG/M2 | RESPIRATION RATE: 16 BRPM | WEIGHT: 166 LBS | TEMPERATURE: 98 F | DIASTOLIC BLOOD PRESSURE: 80 MMHG | OXYGEN SATURATION: 94 %

## 2023-05-15 DIAGNOSIS — Z23 NEED FOR VACCINATION: ICD-10-CM

## 2023-05-15 DIAGNOSIS — Z00.00 PHYSICAL EXAM, ANNUAL: ICD-10-CM

## 2023-05-15 DIAGNOSIS — E78.2 MIXED HYPERLIPIDEMIA: ICD-10-CM

## 2023-05-15 PROCEDURE — 90471 IMMUNIZATION ADMIN: CPT | Performed by: PHYSICIAN ASSISTANT

## 2023-05-15 PROCEDURE — 1126F AMNT PAIN NOTED NONE PRSNT: CPT | Performed by: PHYSICIAN ASSISTANT

## 2023-05-15 PROCEDURE — 3079F DIAST BP 80-89 MM HG: CPT | Performed by: PHYSICIAN ASSISTANT

## 2023-05-15 PROCEDURE — 99395 PREV VISIT EST AGE 18-39: CPT | Mod: 25 | Performed by: PHYSICIAN ASSISTANT

## 2023-05-15 PROCEDURE — 3074F SYST BP LT 130 MM HG: CPT | Performed by: PHYSICIAN ASSISTANT

## 2023-05-15 PROCEDURE — 90746 HEPB VACCINE 3 DOSE ADULT IM: CPT | Performed by: PHYSICIAN ASSISTANT

## 2023-05-15 ASSESSMENT — FIBROSIS 4 INDEX: FIB4 SCORE: 0.66

## 2023-05-15 NOTE — PROGRESS NOTES
CC:    Chief Complaint   Patient presents with    Lab Results       HISTORY OF THE PRESENT ILLNESS:  38 y.o. male presenting for annual physical exam.   Pt's lipids mildly elevated. Pt has been on plant based diet and wondering what else can be done to help improve his numbers. His ASCVD score at 1%.      No problem-specific Assessment & Plan notes found for this encounter.    Allergies: Patient has no known allergies.    Current Outpatient Medications Ordered in Epic   Medication Sig Dispense Refill    cyanocobalamin (VITAMIN B-12) 100 MCG Tab Take 100 mcg by mouth every day.      SUMAtriptan (IMITREX) 50 MG Tab TAKE ONE TABLET BY MOUTH AT THE ONSET OF HEADACHE, REPEAT IN ONE HOUR AS NEEDED 9 Tablet 5    propranolol LA (INDERAL LA) 60 MG CAPSULE SR 24 HR TAKE ONE CAPSULE BY MOUTH EVERY DAY 90 Capsule 3    metoclopramide (REGLAN) 10 MG Tab 1-3 tab at headache/nausea onset; repeat in 4-6 hours prn 45 Tab 1    cyclobenzaprine (FLEXERIL) 10 MG Tab Take 1 Tab by mouth 3 times a day as needed. 30 Tab 0    Cholecalciferol (VITAMIN D-3 PO) Take  by mouth.      Ascorbic Acid (VITAMIN C PO) Take  by mouth. (Patient not taking: Reported on 4/7/2023)      Multiple Vitamin (MULTI-VITAMIN DAILY) TABS Take 1 Tablet by mouth every day.   (Patient not taking: Reported on 4/7/2023)       No current [facility]-ordered facility-administered medications on file.       Past Medical History:   Diagnosis Date    Head ache     Intractable migraine without aura     Chronic type Failed (SE): Topamax (cognitive) Failed (ineffective): Mg, Feverfew, B2   ICD-10 transition       Past Surgical History:   Procedure Laterality Date    APPENDECTOMY      2008       Social History     Tobacco Use    Smoking status: Never    Smokeless tobacco: Never   Vaping Use    Vaping Use: Never used   Substance Use Topics    Alcohol use: Not Currently     Comment: OCC    Drug use: No       Social History     Social History Narrative    Not on file       History  "reviewed. No pertinent family history.    ROS:     - Constitutional: Negative for fever, chills, unexpected weight change, and fatigue/generalized weakness.     - HEENT: Negative for headaches, vision changes, hearing changes, ear pain, ear discharge, rhinorrhea, sinus congestion, sore throat, and neck pain.      - Respiratory: Negative for cough, sputum production, chest congestion, dyspnea, wheezing, and crackles.      - Cardiovascular: Negative for chest pain, palpitations, orthopnea, and bilateral lower extremity edema.           Health Maintenance  Cholesterol Screening: Fasting lipid panel  Diabetes Screening: Fasting blood sugar  Exercise: Regular exercise.   Substance Abuse: None  Safe in relationship Yes     Cancer screening  Colorectal Cancer Screening: N/A         Exam: /80   Pulse 64   Temp 36.7 °C (98 °F) (Temporal)   Resp 16   Ht 1.778 m (5' 10\")   Wt 75.3 kg (166 lb)   SpO2 94%  Body mass index is 23.82 kg/m².    General: Normal appearing. No distress.  HEENT: Normocephalic. Eyes conjunctiva clear lids without ptosis, pupils equal and reactive to light accommodation, ears normal shape and contour, canals are clear bilaterally, tympanic membranes are benign, nasal mucosa benign, oropharynx is without erythema, edema or exudates.   Neck: Supple without JVD or bruit. No thyromegaly. No cervical lymphadenopathy  Pulmonary: Clear to ausculation.  Normal effort. No rales, ronchi, or wheezing.  Cardiovascular: Regular rate and rhythm without murmur, gallops or rubs  Neurologic: Grossly nonfocal, gait normal, normal muscle tone  Skin: Warm and dry.  No obvious lesions.  Musculoskeletal: No extremity cyanosis, clubbing, or edema. No deformity  Psych: Normal mood and affect. Alert and oriented x3. Judgment and insight is normal.    Please note that this dictation was created using voice recognition software. I have made every reasonable attempt to correct obvious errors, but I expect that there are " errors of grammar and possibly content that I did not discover before finalizing the note.      Assessment/Plan  1. Need for vaccination    - Hepatitis B Vaccine Adult 20+    2. Physical exam, annual  PE conducted  - CBC WITH DIFFERENTIAL; Future  - Comp Metabolic Panel; Future  - HEMOGLOBIN A1C; Future  - Lipid Profile; Future    3. Mixed hyperlipidemia  Reassurance. Continue to monitor annually

## 2023-05-23 DIAGNOSIS — G43.019 INTRACTABLE MIGRAINE WITHOUT AURA AND WITHOUT STATUS MIGRAINOSUS: ICD-10-CM

## 2023-05-23 RX ORDER — PROPRANOLOL HCL 60 MG
CAPSULE, EXTENDED RELEASE 24HR ORAL
Qty: 90 CAPSULE | Refills: 3 | Status: SHIPPED | OUTPATIENT
Start: 2023-05-23

## 2023-05-23 NOTE — TELEPHONE ENCOUNTER
Received request via: Pharmacy    Was the patient seen in the last year in this department? No    Does the patient have an active prescription (recently filled or refills available) for medication(s) requested? Yes.    Does the patient have [facility] Plus and need 100 day supply (blood pressure, diabetes and cholesterol meds only)? Medication is not for cholesterol, blood pressure or diabetes

## 2023-08-14 DIAGNOSIS — G43.019 INTRACTABLE MIGRAINE WITHOUT AURA AND WITHOUT STATUS MIGRAINOSUS: ICD-10-CM

## 2023-08-14 RX ORDER — SUMATRIPTAN 50 MG/1
TABLET, FILM COATED ORAL
Qty: 9 TABLET | Refills: 5 | Status: SHIPPED | OUTPATIENT
Start: 2023-08-14 | End: 2024-02-23

## 2023-08-14 NOTE — TELEPHONE ENCOUNTER
Received request via: Pharmacy    Was the patient seen in the last year in this department? No    Does the patient have an active prescription (recently filled or refills available) for medication(s) requested? Yes.     Does the patient have [facility] and need 100 day supply (blood pressure, diabetes and cholesterol meds only)? Medication is not for cholesterol, blood pressure or diabetes

## 2023-08-15 ENCOUNTER — TELEPHONE (OUTPATIENT)
Dept: NEUROLOGY | Facility: MEDICAL CENTER | Age: 38
End: 2023-08-15
Payer: COMMERCIAL

## 2023-08-16 ENCOUNTER — TELEPHONE (OUTPATIENT)
Dept: NEUROLOGY | Facility: MEDICAL CENTER | Age: 38
End: 2023-08-16
Payer: COMMERCIAL

## 2023-08-16 NOTE — TELEPHONE ENCOUNTER
Attempted to contact patient at [phone] to discuss [facility] and services/benefits offered. No answer, left voicemail.    Barbara Duque

## 2023-10-17 ENCOUNTER — NON-PROVIDER VISIT (OUTPATIENT)
Dept: MEDICAL GROUP | Facility: PHYSICIAN GROUP | Age: 38
End: 2023-10-17
Payer: COMMERCIAL

## 2023-10-17 DIAGNOSIS — Z23 NEED FOR VACCINATION: ICD-10-CM

## 2023-10-17 PROCEDURE — 90686 IIV4 VACC NO PRSV 0.5 ML IM: CPT | Performed by: NURSE PRACTITIONER

## 2023-10-17 PROCEDURE — 90746 HEPB VACCINE 3 DOSE ADULT IM: CPT | Performed by: NURSE PRACTITIONER

## 2023-10-17 PROCEDURE — 90471 IMMUNIZATION ADMIN: CPT | Performed by: NURSE PRACTITIONER

## 2023-10-17 PROCEDURE — 90472 IMMUNIZATION ADMIN EACH ADD: CPT | Performed by: NURSE PRACTITIONER

## 2023-10-17 NOTE — PROGRESS NOTES
"Mesfin Beth is a 38 y.o. male here for a non-provider visit for:   HEPATITIS B 3 of 3    Reason for immunization: continue or complete series started at the office  Immunization records indicate need for vaccine: Yes, confirmed with Epic  Minimum interval has been met for this vaccine: Yes  ABN completed: Yes    VIS Dated  5/12/2023 was given to patient: Yes  All IAC Questionnaire questions were answered \"No.\"    Patient tolerated injection and no adverse effects were observed or reported: Yes    Pt scheduled for next dose in series: No, series completed.      Mesfin Beth is a 38 y.o. male here for a non-provider visit for:   FLU    Reason for immunization: Annual Flu Vaccine  Immunization records indicate need for vaccine: Yes, confirmed with Epic  Minimum interval has been met for this vaccine: Yes  ABN completed: Yes    VIS Dated  8/6/2021 was given to patient: Yes  All IAC Questionnaire questions were answered \"No.\"    Patient tolerated injection and no adverse effects were observed or reported: Yes    Pt scheduled for next dose in series: No   "

## 2024-01-02 ENCOUNTER — TELEPHONE (OUTPATIENT)
Dept: NEUROLOGY | Facility: MEDICAL CENTER | Age: 39
End: 2024-01-02
Payer: COMMERCIAL

## 2024-01-02 NOTE — TELEPHONE ENCOUNTER
Phone Number Called: [phone]    Call outcome: Left detailed message for patient. Informed to call back with any additional questions.    Message: LVM for Mesfin to call back regarding his prescription that was refused due to needing to be seen in the clinic. Dr. Garcia wants to see him before he issues a new refill. Call back the office to schedule that office visit.

## 2024-02-22 DIAGNOSIS — G43.019 INTRACTABLE MIGRAINE WITHOUT AURA AND WITHOUT STATUS MIGRAINOSUS: ICD-10-CM

## 2024-02-23 ENCOUNTER — TELEPHONE (OUTPATIENT)
Dept: NEUROLOGY | Facility: MEDICAL CENTER | Age: 39
End: 2024-02-23
Payer: COMMERCIAL

## 2024-02-23 RX ORDER — SUMATRIPTAN 50 MG/1
TABLET, FILM COATED ORAL
Qty: 9 TABLET | Refills: 5 | Status: SHIPPED | OUTPATIENT
Start: 2024-02-23

## 2024-02-23 NOTE — TELEPHONE ENCOUNTER
I called pt, spoke with him. Pt is aware he needs to schedule an appt in order to get med refills approved, pt agreed and has been scheduled on 03/29/24 with Dr. Garcia. Pt is aware Rx refill request will be send to provider, and he will be contacted once we have a response Stacy ALBRIGHT MA

## 2024-02-23 NOTE — TELEPHONE ENCOUNTER
Received request via: Pharmacy    Medication Name/Dosage SUMAtriptan (IMITREX) 50 MG Tab     When was medication last prescribed 08/14/23    How many refills were previously provided 5    How many Refills does he patient have left from last prescription 0    Was the patient seen in the last year in this department? No   Date of last office visit 04/01/22     Per last Neurology Office Visit, when was the date of next follow up visit set for?                            Date of office visit follow up request 1yr     Does the patient have an upcoming appointment? yes   If yes, when              If no, schedule appointment 03/29/24    Does the patient have [facility] Plus and need 100 day supply (blood pressure, diabetes and cholesterol meds only)? Patient does not have SCP

## 2024-03-29 ENCOUNTER — OFFICE VISIT (OUTPATIENT)
Dept: NEUROLOGY | Facility: MEDICAL CENTER | Age: 39
End: 2024-03-29
Attending: PSYCHIATRY & NEUROLOGY
Payer: COMMERCIAL

## 2024-03-29 ENCOUNTER — PHARMACY VISIT (OUTPATIENT)
Dept: PHARMACY | Facility: MEDICAL CENTER | Age: 39
End: 2024-03-29
Payer: COMMERCIAL

## 2024-03-29 ENCOUNTER — TELEPHONE (OUTPATIENT)
Dept: NEUROLOGY | Facility: MEDICAL CENTER | Age: 39
End: 2024-03-29

## 2024-03-29 VITALS
SYSTOLIC BLOOD PRESSURE: 114 MMHG | HEIGHT: 70 IN | BODY MASS INDEX: 24.14 KG/M2 | DIASTOLIC BLOOD PRESSURE: 70 MMHG | HEART RATE: 72 BPM | OXYGEN SATURATION: 99 % | WEIGHT: 168.65 LBS | TEMPERATURE: 97.3 F

## 2024-03-29 DIAGNOSIS — Z82.49 FAMILY HISTORY OF CEREBRAL ANEURYSM: ICD-10-CM

## 2024-03-29 DIAGNOSIS — G43.019 INTRACTABLE MIGRAINE WITHOUT AURA AND WITHOUT STATUS MIGRAINOSUS: Primary | ICD-10-CM

## 2024-03-29 DIAGNOSIS — G43.019 INTRACTABLE MIGRAINE WITHOUT AURA AND WITHOUT STATUS MIGRAINOSUS: ICD-10-CM

## 2024-03-29 PROCEDURE — 99212 OFFICE O/P EST SF 10 MIN: CPT | Performed by: PSYCHIATRY & NEUROLOGY

## 2024-03-29 PROCEDURE — RXMED WILLOW AMBULATORY MEDICATION CHARGE: Performed by: PSYCHIATRY & NEUROLOGY

## 2024-03-29 RX ORDER — SUMATRIPTAN 50 MG/1
TABLET, FILM COATED ORAL
Qty: 9 TABLET | Refills: 5 | Status: SHIPPED | OUTPATIENT
Start: 2024-03-29

## 2024-03-29 RX ORDER — METOCLOPRAMIDE 10 MG/1
TABLET ORAL
Qty: 45 TABLET | Refills: 1 | Status: SHIPPED | OUTPATIENT
Start: 2024-03-29

## 2024-03-29 RX ORDER — NAPROXEN SODIUM 550 MG/1
TABLET ORAL
Qty: 45 TABLET | Refills: 1 | Status: SHIPPED | OUTPATIENT
Start: 2024-03-29

## 2024-03-29 RX ORDER — PROPRANOLOL HYDROCHLORIDE 20 MG/1
TABLET ORAL
Qty: 65 TABLET | Refills: 1 | Status: SHIPPED | OUTPATIENT
Start: 2024-03-29

## 2024-03-29 ASSESSMENT — ENCOUNTER SYMPTOMS
LOSS OF CONSCIOUSNESS: 0
HEADACHES: 1
MEMORY LOSS: 0

## 2024-03-29 ASSESSMENT — PATIENT HEALTH QUESTIONNAIRE - PHQ9: CLINICAL INTERPRETATION OF PHQ2 SCORE: 0

## 2024-03-29 ASSESSMENT — FIBROSIS 4 INDEX: FIB4 SCORE: 0.67

## 2024-03-29 NOTE — PROGRESS NOTES
Subjective     Mesfin Beth is a 39 y.o. male who presents for follow-up, last seen almost 2 years ago, with a history of persistent migraine without aura, but who has subsequently found that her mother as well as several first-degree relatives and her mother's family have all suffered from cerebral aneurysms.     KRISTYN Toure states the headaches have continued in a fairly unrelenting pattern though they are better still on Inderal LA 60 mg daily.  The intensity has improved though he is still having headaches frequently.  He can be upwards of 7 days without a headache but then the mild to moderate headache pain recurs.  The severe attacks are always present upon awakening and if he does not treat them they progressed to unilateral pain, incapacity, heightened sensitivities in association, etc.    Imitrex 50 mg was used and proved as effective as the 100 mg dose with fewer side effects of neck pain.  Still it does wipe him out.  On his own he tried Reglan and Aleve, taking 1 tablet of each, if he is into his third or fourth day of headache, and this shuts the whole headache pattern down.    He has also made some significant lifestyle changes which he thinks is helping.  He is wondering now if he can get himself off Inderal without issue.  He did try this in the past on his own and the headaches did simply increase, though he also had significant withdrawal as he stopped the drug cold turkey.    His mother evidently suffered from an intracranial hemorrhage from rupture of an intracranial aneurysm.  Her mother sister and another first-degree relative also have histories of intracranial aneurysmal dilatation.    Medical, surgical and family history is reviewed, there are no new drug allergies.  He is on Inderal LA 60 mg daily, Imitrex 50 mg as needed, Reglan 10 mg as needed, Aleve 220 mg as needed, vitamin D with C, multivitamin and vitamin B12 supplement.    Review of Systems   Constitutional:  Negative for  "malaise/fatigue.   Neurological:  Positive for headaches. Negative for loss of consciousness.   Psychiatric/Behavioral:  Negative for memory loss.    All other systems reviewed and are negative.    Objective     /70 (BP Location: Right arm, Patient Position: Sitting, BP Cuff Size: Adult)   Pulse 72   Temp 36.3 °C (97.3 °F) (Temporal)   Ht 1.778 m (5' 10\")   Wt 76.5 kg (168 lb 10.4 oz)   SpO2 99%   BMI 24.20 kg/m²      Physical Exam    He appears in no acute distress.  Vital signs are stable.  There is no malar rash, jaw or temporal tenderness, or jaw claudication.  The neck is supple, range of motion is full, compression maneuvers are negative.  Spinous processes are nontender to percussion.  There is no tenderness or spasm of the cervical paraspinal and trapezius muscle bodies bilaterally.  Cardiac evaluation reveals a regular rhythm.     Neurological Exam    Fully oriented, there is no aphasia, apraxia, or inattention.    PERRLA/EOMI, visual fields are full, facial movements are symmetric, sensory exam is intact to light touch and temperature.  The tongue and uvula are midline, jaw movements are intact, there is no bulbar dysfunction.  Shoulder shrug and head rotation are normal.    Musculoskeletal exam reveals normal tone throughout, there is no tremor, asterixis, or drift.  Strength is 5/5 throughout.  Reflexes are present at all points without asymmetries, none are dropped, the toes are downgoing bilaterally.    He stands easily, gait is normal and station and stride length, armswing is symmetric.  Heel, toe, and tandem walking are normal.  There is no appendicular dystaxia.  Fine motor control is normal in all 4 extremities, amplitude and frequencies of repetitive movements are symmetric.    Sensory exam is intact to temperature and vibration, Romberg is absent.    Assessment & Plan     1. Intractable migraine without aura and without status migrainosus  The headaches remain partially treated, both " of which would like to see what we can do moving forwards.  Since he is on some different diets and supplements as lifestyle changes go, we will try to taper him off the propranolol slowly to avoid rebound and see what the minimal effective dose can be.  Thus he will take Inderal 20 mg twice daily for 3 weeks, then 20 mg daily for 3 weeks and then discontinue entirely.  He will update as to the status of this titration.  If the headaches increase, he was simply told to get back up to the minimum effective dose.    For rescue, I did recommend Anaprox  mg taken with Reglan 10 mg.  The former is a higher strength of Aleve, and can be taken safely.  These can also be used in conjunction with sumatriptan, but the whole idea is simply to use them instead of the latter if at all possible.    He was reassured that the aneurysmal dilatation is not related to the headaches themselves.  His examination is normal, he has a less than 0.2% chance that his headaches are symptomatic of anything else.  He was also told that neck pain and stiffness is also part of migraine in and of itself, not necessarily cervical degenerative changes causing neck pain which then triggers migraine.  He was reminded that neck pain is not always present leading into a full-blown migraine attack.    - propranolol (INDERAL) 20 MG Tab; Take 1 tablet by mouth twice a day for 3 weeks, THEN 1 tablet by mouth daily for 3 weeks, then stop.  Dispense: 65 Tablet; Refill: 1  - metoclopramide (REGLAN) 10 MG Tab; Take 1-3 tablets at onset of headache/nausea.  May repeat in 4-6 hours as needed  Dispense: 45 Tablet; Refill: 1  - naproxen sodium (ANAPROX DS) 550 MG tablet; Take 1-2 tablets by mouth at onset of pain.  R    Spoke with epeat once in 4 hour as needed for pain.  Dispense: 45 Tablet; Refill: 1    2. Family history of cerebral aneurysm  MRA of the brain is a reasonable test to be done given the what seems to be is a family history significant for  intracranial aneurysmal dilatation.  Depending on the abnormalities if any are found, we can then forward him to the appropriate interventional specialists moving forward.  He was told that aneurysmal rupture is not a cause for migraine headaches, and that migraine is not a risk factor for aneurysmal dilatation.  We will contact him with the results 1 where the other.  We will follow-up otherwise in 6 months.    - MR-MRA HEAD-W/O; Future    Time: 40 minutes in total spent in patient care including pre-charting, record review, discussion with healthcare staff and documentation.  This includes face-to-face time for exam, review, discussion, as well as counseling and coordinating care.

## 2024-03-29 NOTE — TELEPHONE ENCOUNTER
Pt stated that he changed pharmacies to the [facility], The only medication that didn't make it to the pharmacy was his Sumatriptan Rx. Will you please resend? Pharmacy is already attached! Thank you in advance! Johana Stewart, Med Ass't

## 2024-03-29 NOTE — TELEPHONE ENCOUNTER
Received Refill PA request via [facility]  for SUMAtriptan (IMITREX) 50 MG Tablet. (Quantity:9, Day Supply:30)     Insurance: [facility]  Member ID:  [id number]  BIN: [id number]  PCN: [facility]  Group: [id number]     Ran Test claim via [employer] [facility] Pays for a $10.00 copay. Will outreach to patient to offer specialty pharmacy services and or release to preferred pharmacy

## 2024-04-04 ENCOUNTER — PHARMACY VISIT (OUTPATIENT)
Dept: PHARMACY | Facility: MEDICAL CENTER | Age: 39
End: 2024-04-04
Payer: COMMERCIAL

## 2024-04-04 PROCEDURE — RXMED WILLOW AMBULATORY MEDICATION CHARGE: Performed by: PSYCHIATRY & NEUROLOGY

## 2024-05-04 ENCOUNTER — HOSPITAL ENCOUNTER (OUTPATIENT)
Dept: RADIOLOGY | Facility: MEDICAL CENTER | Age: 39
End: 2024-05-04
Attending: PSYCHIATRY & NEUROLOGY
Payer: COMMERCIAL

## 2024-05-04 DIAGNOSIS — Z82.49 FAMILY HISTORY OF CEREBRAL ANEURYSM: ICD-10-CM

## 2024-05-08 ENCOUNTER — PHARMACY VISIT (OUTPATIENT)
Dept: PHARMACY | Facility: MEDICAL CENTER | Age: 39
End: 2024-05-08
Payer: COMMERCIAL

## 2024-05-08 PROCEDURE — RXMED WILLOW AMBULATORY MEDICATION CHARGE: Performed by: PSYCHIATRY & NEUROLOGY

## 2024-05-11 ENCOUNTER — HOSPITAL ENCOUNTER (OUTPATIENT)
Dept: LAB | Facility: MEDICAL CENTER | Age: 39
End: 2024-05-11
Attending: PHYSICIAN ASSISTANT
Payer: COMMERCIAL

## 2024-05-11 DIAGNOSIS — Z00.00 PHYSICAL EXAM, ANNUAL: ICD-10-CM

## 2024-05-11 LAB
ALBUMIN SERPL BCP-MCNC: 4.9 G/DL (ref 3.2–4.9)
ALBUMIN/GLOB SERPL: 2.2 G/DL
ALP SERPL-CCNC: 54 U/L (ref 30–99)
ALT SERPL-CCNC: 26 U/L (ref 2–50)
ANION GAP SERPL CALC-SCNC: 11 MMOL/L (ref 7–16)
AST SERPL-CCNC: 27 U/L (ref 12–45)
BASOPHILS # BLD AUTO: 0.7 % (ref 0–1.8)
BASOPHILS # BLD: 0.03 K/UL (ref 0–0.12)
BILIRUB SERPL-MCNC: 1.1 MG/DL (ref 0.1–1.5)
BUN SERPL-MCNC: 8 MG/DL (ref 8–22)
CALCIUM ALBUM COR SERPL-MCNC: 8.8 MG/DL (ref 8.5–10.5)
CALCIUM SERPL-MCNC: 9.5 MG/DL (ref 8.5–10.5)
CHLORIDE SERPL-SCNC: 104 MMOL/L (ref 96–112)
CHOLEST SERPL-MCNC: 196 MG/DL (ref 100–199)
CO2 SERPL-SCNC: 26 MMOL/L (ref 20–33)
CREAT SERPL-MCNC: 0.81 MG/DL (ref 0.5–1.4)
EOSINOPHIL # BLD AUTO: 0.1 K/UL (ref 0–0.51)
EOSINOPHIL NFR BLD: 2.4 % (ref 0–6.9)
ERYTHROCYTE [DISTWIDTH] IN BLOOD BY AUTOMATED COUNT: 38.7 FL (ref 35.9–50)
EST. AVERAGE GLUCOSE BLD GHB EST-MCNC: 94 MG/DL
FASTING STATUS PATIENT QL REPORTED: NORMAL
GFR SERPLBLD CREATININE-BSD FMLA CKD-EPI: 115 ML/MIN/1.73 M 2
GLOBULIN SER CALC-MCNC: 2.2 G/DL (ref 1.9–3.5)
GLUCOSE SERPL-MCNC: 101 MG/DL (ref 65–99)
HBA1C MFR BLD: 4.9 % (ref 4–5.6)
HCT VFR BLD AUTO: 45.2 % (ref 42–52)
HDLC SERPL-MCNC: 43 MG/DL
HGB BLD-MCNC: 16 G/DL (ref 14–18)
IMM GRANULOCYTES # BLD AUTO: 0.01 K/UL (ref 0–0.11)
IMM GRANULOCYTES NFR BLD AUTO: 0.2 % (ref 0–0.9)
LDLC SERPL CALC-MCNC: 127 MG/DL
LYMPHOCYTES # BLD AUTO: 1.35 K/UL (ref 1–4.8)
LYMPHOCYTES NFR BLD: 32.1 % (ref 22–41)
MCH RBC QN AUTO: 30.9 PG (ref 27–33)
MCHC RBC AUTO-ENTMCNC: 35.4 G/DL (ref 32.3–36.5)
MCV RBC AUTO: 87.3 FL (ref 81.4–97.8)
MONOCYTES # BLD AUTO: 0.44 K/UL (ref 0–0.85)
MONOCYTES NFR BLD AUTO: 10.5 % (ref 0–13.4)
NEUTROPHILS # BLD AUTO: 2.27 K/UL (ref 1.82–7.42)
NEUTROPHILS NFR BLD: 54.1 % (ref 44–72)
NRBC # BLD AUTO: 0 K/UL
NRBC BLD-RTO: 0 /100 WBC (ref 0–0.2)
PLATELET # BLD AUTO: 255 K/UL (ref 164–446)
PMV BLD AUTO: 9.8 FL (ref 9–12.9)
POTASSIUM SERPL-SCNC: 4.6 MMOL/L (ref 3.6–5.5)
PROT SERPL-MCNC: 7.1 G/DL (ref 6–8.2)
RBC # BLD AUTO: 5.18 M/UL (ref 4.7–6.1)
SODIUM SERPL-SCNC: 141 MMOL/L (ref 135–145)
TRIGL SERPL-MCNC: 131 MG/DL (ref 0–149)
WBC # BLD AUTO: 4.2 K/UL (ref 4.8–10.8)

## 2024-05-12 DIAGNOSIS — G43.019 INTRACTABLE MIGRAINE WITHOUT AURA AND WITHOUT STATUS MIGRAINOSUS: ICD-10-CM

## 2024-05-13 RX ORDER — PROPRANOLOL HCL 60 MG
CAPSULE, EXTENDED RELEASE 24HR ORAL
Qty: 90 CAPSULE | Refills: 3 | Status: SHIPPED | OUTPATIENT
Start: 2024-05-13

## 2024-05-14 NOTE — TELEPHONE ENCOUNTER
Received request via: Pharmacy    Medication Name/Dosage Propranolol     When was medication last prescribed 5/23/23    How many refills were previously provided 3    How many Refills does he patient have left from last prescription 0    Was the patient seen in the last year in this department? Yes   Date of last office visit 3/29/2024     Per last Neurology Office Visit, when was the date of next follow up visit set for?                          6 mths   Date of office visit follow up request 9/29/2024     Does the patient have an upcoming appointment? Yes   If yes, when 3/28/2025             If no, schedule appointment     Does the patient have [facility] Plus and need 100 day supply (blood pressure, diabetes and cholesterol meds only)? Medication is not for cholesterol, blood pressure or diabetes

## 2024-05-20 ENCOUNTER — OFFICE VISIT (OUTPATIENT)
Dept: MEDICAL GROUP | Facility: PHYSICIAN GROUP | Age: 39
End: 2024-05-20
Payer: COMMERCIAL

## 2024-05-20 VITALS
HEART RATE: 60 BPM | WEIGHT: 171 LBS | BODY MASS INDEX: 24.48 KG/M2 | DIASTOLIC BLOOD PRESSURE: 78 MMHG | OXYGEN SATURATION: 100 % | SYSTOLIC BLOOD PRESSURE: 110 MMHG | RESPIRATION RATE: 12 BRPM | HEIGHT: 70 IN | TEMPERATURE: 97.2 F

## 2024-05-20 DIAGNOSIS — Z00.00 PHYSICAL EXAM, ANNUAL: ICD-10-CM

## 2024-05-20 DIAGNOSIS — Z11.3 SCREEN FOR STD (SEXUALLY TRANSMITTED DISEASE): ICD-10-CM

## 2024-05-20 DIAGNOSIS — G43.019 INTRACTABLE MIGRAINE WITHOUT AURA AND WITHOUT STATUS MIGRAINOSUS: ICD-10-CM

## 2024-05-20 DIAGNOSIS — R06.83 SNORING: ICD-10-CM

## 2024-05-20 DIAGNOSIS — Z11.59 ENCOUNTER FOR HEPATITIS C SCREENING TEST FOR LOW RISK PATIENT: ICD-10-CM

## 2024-05-20 PROCEDURE — 3078F DIAST BP <80 MM HG: CPT | Performed by: PHYSICIAN ASSISTANT

## 2024-05-20 PROCEDURE — 99395 PREV VISIT EST AGE 18-39: CPT | Performed by: PHYSICIAN ASSISTANT

## 2024-05-20 PROCEDURE — 3074F SYST BP LT 130 MM HG: CPT | Performed by: PHYSICIAN ASSISTANT

## 2024-05-20 ASSESSMENT — FIBROSIS 4 INDEX: FIB4 SCORE: 0.81

## 2024-05-20 NOTE — PROGRESS NOTES
CC:    Chief Complaint   Patient presents with    Annual Exam     PE        HISTORY OF THE PRESENT ILLNESS: Patient is a 39 y.o. male presenting for annual PE.     History of Present Illness  The patient presents for an annual checkup.    The patient has been under the care of Dr. Garcia for his chronic headaches and migraines. Approximately two weeks ago, Dr. Garcia ordered an MRA scan due to his familial history of aneurysms, which yielded normal results. Despite his long-standing history of headaches, Dr. Garcia did not express concern about a brain tumor and recommended an aneurysm evaluation. The patient continues to experience headaches and is seeking to ascertain the root cause. His headaches have decreased in frequency, but he has made dietary modifications and has been prescribed propranolol 60 mg extended release, with a dosage of 20 mg twice daily for a duration of 3 weeks, after which the dosage will be reduced to once daily. He has also transitioned to a plant-based diet, which has improved his sleep and bowel movements. However, his headaches persist.    The patient reports occasional muffled hearing and a sensation of fluid in his ears, particularly when using Q-tips. He has a history of allergies, but his allergies have decreased since transitioning to a plant-based diet. He also experiences morning headaches, which often disrupt his sleep. The patient has a history of bilateral earaches during his childhood.    The patient frequently snores, which disrupts his sleep. He has not been evaluated for sleep apnea. He sleeps on his side and back. His snoring improved following weight loss.   He has 3 children.   His aunt had 7 coils put in. His grandmother also had aneurysms.      No problem-specific Assessment & Plan notes found for this encounter.    Allergies: Patient has no known allergies.    Current Outpatient Medications Ordered in Epic   Medication Sig Dispense Refill    propranolol LA (INDERAL  LA) 60 MG CAPSULE SR 24 HR TAKE ONE CAPSULE BY MOUTH EVERY DAY 90 Capsule 3    propranolol (INDERAL) 20 MG Tab Take 1 tablet by mouth twice a day for 3 weeks, THEN 1 tablet by mouth daily for 3 weeks, then stop. 65 Tablet 1    metoclopramide (REGLAN) 10 MG Tab Take 1-3 tablets at onset of headache/nausea.  May repeat in 4-6 hours as needed 45 Tablet 1    naproxen sodium (ANAPROX DS) 550 MG tablet Take 1-2 tablets by mouth at onset of pain.  Repeat once in 4 hour as needed for pain. 45 Tablet 1    SUMAtriptan (IMITREX) 50 MG Tab TAKE 1 TABLET BY MOUTH AT ONSET OF HEADACHE, MAY REPEAT DOSE IN 1 HOUR AS NEEDED 9 Tablet 5    cyanocobalamin (VITAMIN B-12) 100 MCG Tab Take 100 mcg by mouth every day.      Cholecalciferol (VITAMIN D-3 PO) Take  by mouth.      Ascorbic Acid (VITAMIN C PO) Take  by mouth. (Patient not taking: Reported on 5/20/2024)      Multiple Vitamin (MULTI-VITAMIN DAILY) TABS Take 1 Tablet by mouth every day.   (Patient not taking: Reported on 5/20/2024)       No current [facility]-ordered facility-administered medications on file.       Past Medical History:   Diagnosis Date    Head ache     Intractable migraine without aura     Chronic type Failed (SE): Topamax (cognitive) Failed (ineffective): Mg, Feverfew, B2   ICD-10 transition       Past Surgical History:   Procedure Laterality Date    APPENDECTOMY      2008       Social History     Tobacco Use    Smoking status: Never    Smokeless tobacco: Never   Vaping Use    Vaping status: Never Used   Substance Use Topics    Alcohol use: Not Currently     Comment: OCC    Drug use: No       Social History     Social History Narrative    Not on file       No family history on file.    ROS:     - Constitutional: Negative for fever, chills, unexpected weight change, and fatigue/generalized weakness.     - HEENT: Negative for headaches, vision changes, hearing changes, ear pain, ear discharge, rhinorrhea, sinus congestion, sore throat, and neck pain.      - Respiratory:  "Negative for cough, sputum production, chest congestion, dyspnea, wheezing, and crackles.      - Cardiovascular: Negative for chest pain, palpitations, orthopnea, and bilateral lower extremity edema.     - Gastrointestinal: Negative for heartburn, nausea, vomiting, abdominal pain, hematochezia, melena, diarrhea, constipation, and greasy/foul-smelling stools.     - Genitourinary: Negative for dysuria, polyuria, hematuria, pyuria, urinary urgency, and urinary incontinence.     - Musculoskeletal: Negative for myalgias, back pain, and joint pain.     - Skin: Negative for rash, itching, cyanotic skin color change.     - Neurological:Positive for headaches.  Negative for dizziness, tingling, tremors, focal sensory deficit, focal weakness     - Endo/Heme/Allergies: Does not bruise/bleed easily.     - Psychiatric/Behavioral: Negative for depression, suicidal/homicidal ideation and memory loss.            .      Exam: /78   Pulse 60   Temp 36.2 °C (97.2 °F) (Temporal)   Resp 12   Ht 1.778 m (5' 10\")   Wt 77.6 kg (171 lb)   SpO2 100%  Body mass index is 24.54 kg/m².    General: Normal appearing. No acute distress.  Skin: Warm and dry.  No obvious lesions.  HEENT: Normocephalic. Eyes conjunctiva clear lids without ptosis, ears normal shape and contour  Cardiovascular: Regular rate and rhythm without murmur.   Respiratory: Clear to auscultation bilaterally, no rhonchi wheezing or rales.  Neurologic: Grossly nonfocal, A&O x3, gait normal,  Musculoskeletal: No deformity or swelling.   Extremities: No extremity cyanosis, clubbing, or edema.  Psych: Normal mood and affect. Judgment and insight is normal.    Please note that this dictation was created using voice recognition software. I have made every reasonable attempt to correct obvious errors, but I expect that there are errors of grammar and possibly content that I did not discover before finalizing the note.      Health Maintenance  Cholesterol Screening: Fasting " lipid panel  Diabetes Screening: Fasting blood sugar  Exercise: Regular exercise.   Substance Abuse: None  Safe in relationship Yes     Cancer screening  Colorectal Cancer Screening: N/A       Assessment & Plan  1. Physical exam, annual  PE conducted.   - CBC WITH DIFFERENTIAL; Future  - Comp Metabolic Panel; Future  - HEMOGLOBIN A1C; Future  - Lipid Profile; Future    2. Encounter for hepatitis C screening test for low risk patient    - HEP C VIRUS ANTIBODY; Future    3. Screen for STD (sexually transmitted disease)    - HIV AG/AB COMBO ASSAY SCREENING; Future    4. Intractable migraine without aura and without status migrainosus  The patient's Magnetic Resonance Angiography (MRA) results were within normal limits. The patient was informed that dietary modifications are unlikely to significantly impact his headaches.    5. Snoring.  The patient does not exhibit symptoms of sleep apnea. The patient will inform us of his decision regarding the need for a sleep study.

## 2024-06-10 ENCOUNTER — PHARMACY VISIT (OUTPATIENT)
Dept: PHARMACY | Facility: MEDICAL CENTER | Age: 39
End: 2024-06-10
Payer: COMMERCIAL

## 2024-06-10 PROCEDURE — RXMED WILLOW AMBULATORY MEDICATION CHARGE: Performed by: PSYCHIATRY & NEUROLOGY

## 2024-07-09 PROCEDURE — RXMED WILLOW AMBULATORY MEDICATION CHARGE: Performed by: PSYCHIATRY & NEUROLOGY

## 2024-07-22 ENCOUNTER — PHARMACY VISIT (OUTPATIENT)
Dept: PHARMACY | Facility: MEDICAL CENTER | Age: 39
End: 2024-07-22
Payer: COMMERCIAL

## 2024-08-16 PROCEDURE — RXMED WILLOW AMBULATORY MEDICATION CHARGE: Performed by: PSYCHIATRY & NEUROLOGY

## 2024-08-19 ENCOUNTER — PHARMACY VISIT (OUTPATIENT)
Dept: PHARMACY | Facility: MEDICAL CENTER | Age: 39
End: 2024-08-19
Payer: COMMERCIAL

## 2024-09-18 PROCEDURE — RXMED WILLOW AMBULATORY MEDICATION CHARGE: Performed by: PSYCHIATRY & NEUROLOGY

## 2024-09-23 ENCOUNTER — PHARMACY VISIT (OUTPATIENT)
Dept: PHARMACY | Facility: MEDICAL CENTER | Age: 39
End: 2024-09-23
Payer: COMMERCIAL

## 2024-10-15 DIAGNOSIS — G43.019 INTRACTABLE MIGRAINE WITHOUT AURA AND WITHOUT STATUS MIGRAINOSUS: ICD-10-CM

## 2024-10-15 RX ORDER — SUMATRIPTAN 50 MG/1
TABLET, FILM COATED ORAL
Qty: 9 TABLET | Refills: 5 | Status: SHIPPED | OUTPATIENT
Start: 2024-10-15

## 2024-10-16 ENCOUNTER — TELEPHONE (OUTPATIENT)
Dept: PHARMACY | Facility: MEDICAL CENTER | Age: 39
End: 2024-10-16
Payer: COMMERCIAL

## 2024-10-16 PROCEDURE — RXMED WILLOW AMBULATORY MEDICATION CHARGE: Performed by: PSYCHIATRY & NEUROLOGY

## 2024-10-18 ENCOUNTER — PHARMACY VISIT (OUTPATIENT)
Dept: PHARMACY | Facility: MEDICAL CENTER | Age: 39
End: 2024-10-18
Payer: COMMERCIAL

## 2024-11-13 PROCEDURE — RXMED WILLOW AMBULATORY MEDICATION CHARGE: Performed by: PSYCHIATRY & NEUROLOGY

## 2024-11-14 ENCOUNTER — PHARMACY VISIT (OUTPATIENT)
Dept: PHARMACY | Facility: MEDICAL CENTER | Age: 39
End: 2024-11-14
Payer: COMMERCIAL

## 2024-12-15 PROCEDURE — RXMED WILLOW AMBULATORY MEDICATION CHARGE: Performed by: PSYCHIATRY & NEUROLOGY

## 2024-12-16 ENCOUNTER — PHARMACY VISIT (OUTPATIENT)
Dept: PHARMACY | Facility: MEDICAL CENTER | Age: 39
End: 2024-12-16
Payer: COMMERCIAL

## 2025-01-15 ENCOUNTER — PHARMACY VISIT (OUTPATIENT)
Dept: PHARMACY | Facility: MEDICAL CENTER | Age: 40
End: 2025-01-15
Payer: COMMERCIAL

## 2025-01-15 PROCEDURE — RXMED WILLOW AMBULATORY MEDICATION CHARGE: Performed by: PSYCHIATRY & NEUROLOGY

## 2025-02-13 PROCEDURE — RXMED WILLOW AMBULATORY MEDICATION CHARGE: Performed by: PSYCHIATRY & NEUROLOGY

## 2025-02-18 ENCOUNTER — PHARMACY VISIT (OUTPATIENT)
Dept: PHARMACY | Facility: MEDICAL CENTER | Age: 40
End: 2025-02-18
Payer: COMMERCIAL

## 2025-03-12 PROCEDURE — RXMED WILLOW AMBULATORY MEDICATION CHARGE: Performed by: PSYCHIATRY & NEUROLOGY

## 2025-03-14 ENCOUNTER — PHARMACY VISIT (OUTPATIENT)
Dept: PHARMACY | Facility: MEDICAL CENTER | Age: 40
End: 2025-03-14
Payer: COMMERCIAL

## 2025-03-28 ENCOUNTER — PHARMACY VISIT (OUTPATIENT)
Dept: PHARMACY | Facility: MEDICAL CENTER | Age: 40
End: 2025-03-28
Payer: COMMERCIAL

## 2025-03-28 ENCOUNTER — OFFICE VISIT (OUTPATIENT)
Dept: NEUROLOGY | Facility: MEDICAL CENTER | Age: 40
End: 2025-03-28
Attending: PSYCHIATRY & NEUROLOGY
Payer: COMMERCIAL

## 2025-03-28 ENCOUNTER — TELEPHONE (OUTPATIENT)
Dept: PHARMACY | Facility: MEDICAL CENTER | Age: 40
End: 2025-03-28

## 2025-03-28 VITALS
WEIGHT: 170.64 LBS | DIASTOLIC BLOOD PRESSURE: 68 MMHG | HEIGHT: 70 IN | TEMPERATURE: 97.2 F | RESPIRATION RATE: 16 BRPM | SYSTOLIC BLOOD PRESSURE: 114 MMHG | HEART RATE: 77 BPM | BODY MASS INDEX: 24.43 KG/M2 | OXYGEN SATURATION: 97 %

## 2025-03-28 DIAGNOSIS — G43.019 INTRACTABLE MIGRAINE WITHOUT AURA AND WITHOUT STATUS MIGRAINOSUS: Primary | ICD-10-CM

## 2025-03-28 DIAGNOSIS — Z82.49 FAMILY HISTORY OF CEREBRAL ANEURYSM: ICD-10-CM

## 2025-03-28 PROCEDURE — 3074F SYST BP LT 130 MM HG: CPT | Performed by: PSYCHIATRY & NEUROLOGY

## 2025-03-28 PROCEDURE — 3078F DIAST BP <80 MM HG: CPT | Performed by: PSYCHIATRY & NEUROLOGY

## 2025-03-28 PROCEDURE — 99214 OFFICE O/P EST MOD 30 MIN: CPT | Performed by: PSYCHIATRY & NEUROLOGY

## 2025-03-28 PROCEDURE — RXMED WILLOW AMBULATORY MEDICATION CHARGE: Performed by: PSYCHIATRY & NEUROLOGY

## 2025-03-28 RX ORDER — SUMATRIPTAN 50 MG/1
TABLET, FILM COATED ORAL
Qty: 9 TABLET | Refills: 5 | Status: SHIPPED | OUTPATIENT
Start: 2025-03-28

## 2025-03-28 RX ORDER — NAPROXEN SODIUM 550 MG/1
TABLET ORAL
Qty: 45 TABLET | Refills: 1 | Status: SHIPPED | OUTPATIENT
Start: 2025-03-28

## 2025-03-28 RX ORDER — ATOGEPANT 60 MG/1
1 TABLET ORAL DAILY
Qty: 12 TABLET | Refills: 0 | Status: SHIPPED | OUTPATIENT
Start: 2025-03-28

## 2025-03-28 RX ORDER — METOCLOPRAMIDE 10 MG/1
TABLET ORAL
Qty: 45 TABLET | Refills: 1 | Status: SHIPPED | OUTPATIENT
Start: 2025-03-28

## 2025-03-28 ASSESSMENT — PATIENT HEALTH QUESTIONNAIRE - PHQ9: CLINICAL INTERPRETATION OF PHQ2 SCORE: 0

## 2025-03-28 ASSESSMENT — FIBROSIS 4 INDEX: FIB4 SCORE: 0.83

## 2025-03-28 NOTE — TELEPHONE ENCOUNTER
Received [place] request via [facility]  for   SUMAtriptan (IMITREX) 50 MG Tab. (Quantity:9, Day Supply:30)     Insurance: [facility]  Member ID:  [id number]  BIN: [id number]  PCN: [facility]  Group: [id number]     Ran Test claim via [employer] [facility]  RTC RTS - will release to pt's preferred pharmacy on file.

## 2025-03-28 NOTE — PROGRESS NOTES
NEUROLOGY PROGRESS/FOLLOW-UP NOTE:    Referring provider: Martinez Colbert PA-C    Subjective:    CC:  Mesfin presents today for follow-up with a history of refractory migraine without aura, who suffered from his first aura attack recently having titrated himself off Inderal.  He is also status post MRA of the brain.    HPI:  When last seen, Mesfin had been having a steady degree of control with headaches, he wanted to try to get off medication, previous trials were done to rapidly resulting in withdrawal.  This time around he went slowly by 20 mg increments.    He suffered from the most severe headache attack he can recall and recent memory once he got off the Inderal.  This was preceded by a visual episode of double vision, loss of visual acuity and tunnel vision lasting for 20 to 30 minutes, followed by the most severe headache attack he has remembered.  This headache lasted for several days and did not respond to sumatriptan as most of his other headaches have.  This has never recurred.    Since off Inderal, there really has not been much of a change in the headache pattern.  He still is averaging upwards of 8 or 9 headache attacks every month, he has to ration the sumatriptan hand between headaches as a result since he only received 9 tablets/month.  He did notice that Inderal reduced the headache severity to a degree, but the frequency and duration remains.  He has found that there is a heightened sensitivity to light 24/7 since being off the Inderal.    He uses the Imitrex 50 mg with pain onset, there is consistent benefit, though he may repeat the dose on consecutive days even though the pain is much milder.  He has Anaprox DS and Reglan 10 mg to use as needed but he has never taken them with the sumatriptan as I had recommended.  He has tried the Anaprox/Reglan together for moderate headaches with no more than a 50/50 shot of benefit.    He has been off the Inderal now for more than 6 months, and with this  he has been having more than 15 days a month of headache pain, and of these days more than 8 have been associated with 4 hours of incapacity, or worse were not for the Imitrex use.    He has already failed Topamax because of side effects, as well as trials of riboflavin, magnesium and feverfew.    With the family history of aneurysmal dilatation and hemorrhage, he did undergo MRA of the brain, this was done prior to the visual aura attack.  Imaging studies were unremarkable for significant vascular anomaly.  The brain parenchyma itself was also intact.    Medical, surgical and family histories are reviewed, there are no new drug allergies.  At home things are doing well, the 3-year-old is growing like a weed, behaving like any young girl would.  He is on Imitrex 50 mg/Anaprox  mg/Reglan 10 mg as needed, vitamin D and a multivitamin.    Objective:    Physical exam:    He appears in no acute distress.  Vital signs are stable.  There is no malar rash, temporal or jaw tenderness, or jaw claudication.  There is no allodynia.  The neck is supple, range of motion is full.  There is no spasm.  Cardiac evaluation reveals a regular rhythm.    Neurologic exam:    Fully oriented, there is no aphasia, apraxia, or inattention.    PERRLA/EOMI, visual fields are full to finger counting on confrontation bilaterally.  Facial movements are symmetric, sensory exam is intact to temperature and light touch bilaterally.  The tongue and uvula are midline, there is no bulbar dysfunction, jaw movements are intact.  Shoulder shrug and head rotation are symmetric.    Musculoskeletal exam reveals normal tone without tremor or drift.  Strength is intact throughout.  Reflex exam was deferred.    There is no appendicular dystaxia, fine motor control is symmetric in all 4 extremities, amplitude and frequencies equal.  He walks with normal station, armswing is symmetric, stride length maintained bilaterally.    Sensory exam is intact to  vibration and temperature throughout.    MRI of the brain was reviewed, this was completely normal without any evidence of aneurysmal dilatation in the cerebrovascular beds.    ASSESSMENT AND PLAN:    1.  Chronic migraine with and without aura: About 18% of patients with migraine have attacks both with and without aura, sometimes the aura involving only over time.  Such is the case with Mesfin. There is nothing about the visual symptoms that distract me from this assessment.  There certainly was no aneurysmal dilatation as a cause, the MRA was done prior to this episode occurring.  In any case, he also fits criteria for chronic migraine having more than 15 days a month of headache pain, and of these more than 8 associated with incapacity, the pattern ongoing for more than 3 months.  We talked at length about other maintenance therapies, something that he does need to try.  With the aura as well as persistent sensitivity to light, it suggest the underlying migrainous condition is still potentially more problematic.    Qulipta 60 mg tablets will be started every day, side effects reviewed.  We will also obtain authorization for Botox.  We talked at length about this treatment option for chronic migraine.  We also talked about the other CGRP treatments including all of the injectables as possibilities.    For rescue, he was told to take the Anaprox and Reglan with the sumatriptan hand to see if he can prevent headache recurrences on subsequent days.  He notes that the Anaprox and Reglan alone provided no benefit at all.  If there is no difference with using these along with sumatriptan versus sumatriptan alone, there is no reason to use Anaprox and Reglan at all.    2.  Family history of cerebral aneurysm: For him, the history does not apply absent any vascular anomaly on imaging.  There is no reason to repeat imaging on a serial basis.  He has no symptoms suggestive of aneurysmal dilatation with or without rupture.  I  am quite pleased about all of this.    We will follow-up in 1 year on a scheduled basis, sooner if he wants to pursue Botox and it is authorized.    Time: 30 minutes in total spent on patient care including.  From charting, record review, discussion with healthcare staff and documentation.  This includes face-to-face time for exam, review, discussion, as well as counseling and coordinating care.

## 2025-03-31 ENCOUNTER — TELEPHONE (OUTPATIENT)
Dept: NEUROLOGY | Facility: MEDICAL CENTER | Age: 40
End: 2025-03-31

## 2025-03-31 DIAGNOSIS — G43.019 INTRACTABLE MIGRAINE WITHOUT AURA AND WITHOUT STATUS MIGRAINOSUS: ICD-10-CM

## 2025-03-31 NOTE — TELEPHONE ENCOUNTER
Morning Dr Garcia,     Please submit a new Botox order. I scanned the Botox consent form on 3/28/25 but see no Botox Cam ordered.     Thank you,     Myra

## 2025-04-07 DIAGNOSIS — G43.019 INTRACTABLE MIGRAINE WITHOUT AURA AND WITHOUT STATUS MIGRAINOSUS: ICD-10-CM

## 2025-04-07 NOTE — TELEPHONE ENCOUNTER
Received request via: Patient    Medication Name/Dosage     Atogepant (QULIPTA) 60 MG Tab       When was medication last prescribed 03/28/25 Sample    How many refills were previously provided 0    How many Refills does he patient have left from last prescription 0    Was the patient seen in the last year in this department? Yes   Date of last office visit 03/28/25     Per last Neurology Office Visit, when was the date of next follow up visit set for?                            Date of office visit follow up request 1 yr     Does the patient have an upcoming appointment? Yes   If yes, when 03/20/26             If no, schedule appointment N/A    Does the patient have intermediate Plus and need 100 day supply (blood pressure, diabetes and cholesterol meds only)? Patient does not have SCP

## 2025-04-10 ENCOUNTER — TELEPHONE (OUTPATIENT)
Dept: PHARMACY | Facility: MEDICAL CENTER | Age: 40
End: 2025-04-10
Payer: COMMERCIAL

## 2025-04-10 RX ORDER — ATOGEPANT 60 MG/1
1 TABLET ORAL DAILY
Qty: 30 TABLET | Refills: 5 | Status: SHIPPED | OUTPATIENT
Start: 2025-04-10 | End: 2025-04-10 | Stop reason: CLARIF

## 2025-04-10 NOTE — TELEPHONE ENCOUNTER
Received [employer] [facility] request via [facility]  for Atogepant (QULIPTA) 60 MG Tab. (Quantity:30, Day Supply:30)     Insurance: [employer] 2  Member ID:  [id number]  BIN: [id number]  PCN: [id number]  Group: [id number]     Ran Test claim via [facility] medication Rejects stating prior authorization is required.

## 2025-04-10 NOTE — TELEPHONE ENCOUNTER
Prior Authorization for     Atogepant (QULIPTA) 60 MG Tab    (Quantity: 30, Days: 30) has been submitted via Cover My Meds: Key ([id number])    Insurance: [employer]    Will follow up in 24-48 business hours.

## 2025-04-11 ENCOUNTER — PHARMACY VISIT (OUTPATIENT)
Dept: PHARMACY | Facility: MEDICAL CENTER | Age: 40
End: 2025-04-11
Payer: COMMERCIAL

## 2025-04-11 DIAGNOSIS — G43.019 INTRACTABLE MIGRAINE WITHOUT AURA AND WITHOUT STATUS MIGRAINOSUS: ICD-10-CM

## 2025-04-11 PROCEDURE — RXMED WILLOW AMBULATORY MEDICATION CHARGE: Performed by: PSYCHIATRY & NEUROLOGY

## 2025-04-11 RX ORDER — ATOGEPANT 60 MG/1
1 TABLET ORAL DAILY
Qty: 30 TABLET | Refills: 5 | Status: SHIPPED | OUTPATIENT
Start: 2025-04-11

## 2025-04-14 NOTE — TELEPHONE ENCOUNTER
Prior Authorization for Atogepant (QULIPTA) 60 MG Tab  has been approved for a quantity of 30 , day supply 30    Prior Authorization reference number:  [id number]  Insurance: [employer]  Effective dates: 4/10/25 to 10/7/25  Copay: $25     Is patient eligible to fill with [facility]? Yes    Next Steps: The patient's copay exceeds $5.00. Proceed with contacting patient to offer financial assistance.

## 2025-04-14 NOTE — TELEPHONE ENCOUNTER
Called and left a voicemail that the prior auth was approved. Looks like pt may have already filled it? Not sure.

## 2025-04-15 RX ORDER — ATOGEPANT 60 MG/1
1 TABLET ORAL DAILY
Qty: 12 TABLET | Refills: 0 | OUTPATIENT
Start: 2025-04-15

## 2025-05-05 PROCEDURE — RXMED WILLOW AMBULATORY MEDICATION CHARGE: Performed by: PSYCHIATRY & NEUROLOGY

## 2025-05-07 ENCOUNTER — PHARMACY VISIT (OUTPATIENT)
Dept: PHARMACY | Facility: MEDICAL CENTER | Age: 40
End: 2025-05-07
Payer: COMMERCIAL

## 2025-06-03 ENCOUNTER — PHARMACY VISIT (OUTPATIENT)
Dept: PHARMACY | Facility: MEDICAL CENTER | Age: 40
End: 2025-06-03
Payer: COMMERCIAL

## 2025-06-03 PROCEDURE — RXMED WILLOW AMBULATORY MEDICATION CHARGE: Performed by: PSYCHIATRY & NEUROLOGY

## 2025-06-26 ENCOUNTER — APPOINTMENT (OUTPATIENT)
Dept: MEDICAL GROUP | Facility: PHYSICIAN GROUP | Age: 40
End: 2025-06-26
Payer: COMMERCIAL

## 2025-06-30 ENCOUNTER — TELEPHONE (OUTPATIENT)
Dept: MEDICAL GROUP | Facility: PHYSICIAN GROUP | Age: 40
End: 2025-06-30
Payer: COMMERCIAL

## 2025-06-30 DIAGNOSIS — Z00.00 PHYSICAL EXAM, ANNUAL: Primary | ICD-10-CM

## 2025-06-30 NOTE — TELEPHONE ENCOUNTER
Patient called office requesting blood work to be ordered before his appointment on 7/31. He would like a call once this has been done. Please advise

## 2025-07-03 ENCOUNTER — PHARMACY VISIT (OUTPATIENT)
Dept: PHARMACY | Facility: MEDICAL CENTER | Age: 40
End: 2025-07-03
Payer: COMMERCIAL

## 2025-07-03 PROCEDURE — RXMED WILLOW AMBULATORY MEDICATION CHARGE: Performed by: PSYCHIATRY & NEUROLOGY

## 2025-07-09 ENCOUNTER — HOSPITAL ENCOUNTER (OUTPATIENT)
Dept: LAB | Facility: MEDICAL CENTER | Age: 40
End: 2025-07-09
Attending: PHYSICIAN ASSISTANT
Payer: COMMERCIAL

## 2025-07-09 DIAGNOSIS — Z00.00 PHYSICAL EXAM, ANNUAL: ICD-10-CM

## 2025-07-09 LAB
ALBUMIN SERPL BCP-MCNC: 4.8 G/DL (ref 3.2–4.9)
ALBUMIN/GLOB SERPL: 1.9 G/DL
ALP SERPL-CCNC: 52 U/L (ref 30–99)
ALT SERPL-CCNC: 19 U/L (ref 2–50)
ANION GAP SERPL CALC-SCNC: 10 MMOL/L (ref 7–16)
AST SERPL-CCNC: 20 U/L (ref 12–45)
BASOPHILS # BLD AUTO: 0.7 % (ref 0–1.8)
BASOPHILS # BLD: 0.03 K/UL (ref 0–0.12)
BILIRUB SERPL-MCNC: 0.9 MG/DL (ref 0.1–1.5)
BUN SERPL-MCNC: 12 MG/DL (ref 8–22)
CALCIUM ALBUM COR SERPL-MCNC: 9 MG/DL (ref 8.5–10.5)
CALCIUM SERPL-MCNC: 9.6 MG/DL (ref 8.5–10.5)
CHLORIDE SERPL-SCNC: 105 MMOL/L (ref 96–112)
CHOLEST SERPL-MCNC: 202 MG/DL (ref 100–199)
CO2 SERPL-SCNC: 26 MMOL/L (ref 20–33)
CREAT SERPL-MCNC: 0.91 MG/DL (ref 0.5–1.4)
EOSINOPHIL # BLD AUTO: 0.09 K/UL (ref 0–0.51)
EOSINOPHIL NFR BLD: 2.2 % (ref 0–6.9)
ERYTHROCYTE [DISTWIDTH] IN BLOOD BY AUTOMATED COUNT: 38.1 FL (ref 35.9–50)
EST. AVERAGE GLUCOSE BLD GHB EST-MCNC: 97 MG/DL
GFR SERPLBLD CREATININE-BSD FMLA CKD-EPI: 109 ML/MIN/1.73 M 2
GLOBULIN SER CALC-MCNC: 2.5 G/DL (ref 1.9–3.5)
GLUCOSE SERPL-MCNC: 100 MG/DL (ref 65–99)
HBA1C MFR BLD: 5 % (ref 4–5.6)
HCT VFR BLD AUTO: 48.7 % (ref 42–52)
HDLC SERPL-MCNC: 48 MG/DL
HGB BLD-MCNC: 17 G/DL (ref 14–18)
IMM GRANULOCYTES # BLD AUTO: 0 K/UL (ref 0–0.11)
IMM GRANULOCYTES NFR BLD AUTO: 0 % (ref 0–0.9)
LDLC SERPL CALC-MCNC: 133 MG/DL
LYMPHOCYTES # BLD AUTO: 1.28 K/UL (ref 1–4.8)
LYMPHOCYTES NFR BLD: 31.3 % (ref 22–41)
MCH RBC QN AUTO: 30.3 PG (ref 27–33)
MCHC RBC AUTO-ENTMCNC: 34.9 G/DL (ref 32.3–36.5)
MCV RBC AUTO: 86.8 FL (ref 81.4–97.8)
MONOCYTES # BLD AUTO: 0.47 K/UL (ref 0–0.85)
MONOCYTES NFR BLD AUTO: 11.5 % (ref 0–13.4)
NEUTROPHILS # BLD AUTO: 2.22 K/UL (ref 1.82–7.42)
NEUTROPHILS NFR BLD: 54.3 % (ref 44–72)
NRBC # BLD AUTO: 0 K/UL
NRBC BLD-RTO: 0 /100 WBC (ref 0–0.2)
PLATELET # BLD AUTO: 242 K/UL (ref 164–446)
PMV BLD AUTO: 9.8 FL (ref 9–12.9)
POTASSIUM SERPL-SCNC: 4.5 MMOL/L (ref 3.6–5.5)
PROT SERPL-MCNC: 7.3 G/DL (ref 6–8.2)
RBC # BLD AUTO: 5.61 M/UL (ref 4.7–6.1)
SODIUM SERPL-SCNC: 141 MMOL/L (ref 135–145)
TRIGL SERPL-MCNC: 103 MG/DL (ref 0–149)
TSH SERPL DL<=0.005 MIU/L-ACNC: 4 UIU/ML (ref 0.38–5.33)
WBC # BLD AUTO: 4.1 K/UL (ref 4.8–10.8)

## 2025-07-09 PROCEDURE — 85025 COMPLETE CBC W/AUTO DIFF WBC: CPT

## 2025-07-09 PROCEDURE — 80053 COMPREHEN METABOLIC PANEL: CPT

## 2025-07-09 PROCEDURE — 84443 ASSAY THYROID STIM HORMONE: CPT

## 2025-07-09 PROCEDURE — 83036 HEMOGLOBIN GLYCOSYLATED A1C: CPT

## 2025-07-09 PROCEDURE — 80061 LIPID PANEL: CPT

## 2025-07-09 PROCEDURE — 36415 COLL VENOUS BLD VENIPUNCTURE: CPT

## 2025-07-30 PROCEDURE — RXMED WILLOW AMBULATORY MEDICATION CHARGE: Performed by: PSYCHIATRY & NEUROLOGY

## 2025-07-31 ENCOUNTER — APPOINTMENT (OUTPATIENT)
Dept: MEDICAL GROUP | Facility: PHYSICIAN GROUP | Age: 40
End: 2025-07-31
Payer: COMMERCIAL

## 2025-07-31 ENCOUNTER — PHARMACY VISIT (OUTPATIENT)
Dept: PHARMACY | Facility: MEDICAL CENTER | Age: 40
End: 2025-07-31
Payer: COMMERCIAL

## 2025-07-31 VITALS
SYSTOLIC BLOOD PRESSURE: 114 MMHG | OXYGEN SATURATION: 97 % | BODY MASS INDEX: 24.05 KG/M2 | TEMPERATURE: 98.5 F | WEIGHT: 168 LBS | HEART RATE: 87 BPM | HEIGHT: 70 IN | RESPIRATION RATE: 14 BRPM | DIASTOLIC BLOOD PRESSURE: 70 MMHG

## 2025-07-31 DIAGNOSIS — Z00.00 PHYSICAL EXAM, ANNUAL: Primary | ICD-10-CM

## 2025-07-31 DIAGNOSIS — Z11.3 SCREEN FOR STD (SEXUALLY TRANSMITTED DISEASE): ICD-10-CM

## 2025-07-31 DIAGNOSIS — Z11.59 ENCOUNTER FOR HEPATITIS C SCREENING TEST FOR LOW RISK PATIENT: ICD-10-CM

## 2025-07-31 DIAGNOSIS — G43.019 INTRACTABLE MIGRAINE WITHOUT AURA AND WITHOUT STATUS MIGRAINOSUS: ICD-10-CM

## 2025-07-31 SDOH — HEALTH STABILITY: PHYSICAL HEALTH: ON AVERAGE, HOW MANY DAYS PER WEEK DO YOU ENGAGE IN MODERATE TO STRENUOUS EXERCISE (LIKE A BRISK WALK)?: 3 DAYS

## 2025-07-31 SDOH — ECONOMIC STABILITY: FOOD INSECURITY: WITHIN THE PAST 12 MONTHS, THE FOOD YOU BOUGHT JUST DIDN'T LAST AND YOU DIDN'T HAVE MONEY TO GET MORE.: NEVER TRUE

## 2025-07-31 SDOH — ECONOMIC STABILITY: INCOME INSECURITY: IN THE LAST 12 MONTHS, WAS THERE A TIME WHEN YOU WERE NOT ABLE TO PAY THE MORTGAGE OR RENT ON TIME?: NO

## 2025-07-31 SDOH — ECONOMIC STABILITY: FOOD INSECURITY: WITHIN THE PAST 12 MONTHS, YOU WORRIED THAT YOUR FOOD WOULD RUN OUT BEFORE YOU GOT MONEY TO BUY MORE.: NEVER TRUE

## 2025-07-31 SDOH — HEALTH STABILITY: PHYSICAL HEALTH: ON AVERAGE, HOW MANY MINUTES DO YOU ENGAGE IN EXERCISE AT THIS LEVEL?: 40 MIN

## 2025-07-31 SDOH — ECONOMIC STABILITY: INCOME INSECURITY: HOW HARD IS IT FOR YOU TO PAY FOR THE VERY BASICS LIKE FOOD, HOUSING, MEDICAL CARE, AND HEATING?: NOT HARD AT ALL

## 2025-07-31 ASSESSMENT — SOCIAL DETERMINANTS OF HEALTH (SDOH)
WITHIN THE PAST 12 MONTHS, YOU WORRIED THAT YOUR FOOD WOULD RUN OUT BEFORE YOU GOT THE MONEY TO BUY MORE: NEVER TRUE
HOW OFTEN DO YOU HAVE SIX OR MORE DRINKS ON ONE OCCASION: NEVER
HOW OFTEN DO YOU ATTENT MEETINGS OF THE CLUB OR ORGANIZATION YOU BELONG TO?: NEVER
HOW OFTEN DO YOU ATTEND CHURCH OR RELIGIOUS SERVICES?: 1 TO 4 TIMES PER YEAR
HOW OFTEN DO YOU GET TOGETHER WITH FRIENDS OR RELATIVES?: MORE THAN THREE TIMES A WEEK
IN A TYPICAL WEEK, HOW MANY TIMES DO YOU TALK ON THE PHONE WITH FAMILY, FRIENDS, OR NEIGHBORS?: MORE THAN THREE TIMES A WEEK
DO YOU BELONG TO ANY CLUBS OR ORGANIZATIONS SUCH AS CHURCH GROUPS UNIONS, FRATERNAL OR ATHLETIC GROUPS, OR SCHOOL GROUPS?: NO
HOW OFTEN DO YOU ATTEND CHURCH OR RELIGIOUS SERVICES?: 1 TO 4 TIMES PER YEAR
HOW OFTEN DO YOU HAVE A DRINK CONTAINING ALCOHOL: MONTHLY OR LESS
HOW OFTEN DO YOU ATTENT MEETINGS OF THE CLUB OR ORGANIZATION YOU BELONG TO?: NEVER
HOW HARD IS IT FOR YOU TO PAY FOR THE VERY BASICS LIKE FOOD, HOUSING, MEDICAL CARE, AND HEATING?: NOT HARD AT ALL
HOW OFTEN DO YOU GET TOGETHER WITH FRIENDS OR RELATIVES?: MORE THAN THREE TIMES A WEEK
DO YOU BELONG TO ANY CLUBS OR ORGANIZATIONS SUCH AS CHURCH GROUPS UNIONS, FRATERNAL OR ATHLETIC GROUPS, OR SCHOOL GROUPS?: NO
HOW MANY DRINKS CONTAINING ALCOHOL DO YOU HAVE ON A TYPICAL DAY WHEN YOU ARE DRINKING: 1 OR 2
IN A TYPICAL WEEK, HOW MANY TIMES DO YOU TALK ON THE PHONE WITH FAMILY, FRIENDS, OR NEIGHBORS?: MORE THAN THREE TIMES A WEEK
IN THE PAST 12 MONTHS, HAS THE ELECTRIC, GAS, OIL, OR WATER COMPANY THREATENED TO SHUT OFF SERVICE IN YOUR HOME?: NO

## 2025-07-31 ASSESSMENT — FIBROSIS 4 INDEX: FIB4 SCORE: 0.76

## 2025-07-31 ASSESSMENT — LIFESTYLE VARIABLES
HOW MANY STANDARD DRINKS CONTAINING ALCOHOL DO YOU HAVE ON A TYPICAL DAY: 1 OR 2
HOW OFTEN DO YOU HAVE A DRINK CONTAINING ALCOHOL: MONTHLY OR LESS
AUDIT-C TOTAL SCORE: 1
SKIP TO QUESTIONS 9-10: 1
HOW OFTEN DO YOU HAVE SIX OR MORE DRINKS ON ONE OCCASION: NEVER

## 2025-07-31 NOTE — PROGRESS NOTES
"CC:    Chief Complaint   Patient presents with    Annual Exam     PE        HISTORY OF THE PRESENT ILLNESS:  40 y.o. male presenting for annual physical exam.   Pt's migraines much improved since starting on Qulipta. Has not need his imitrex at all since starting on it.     The 10-year ASCVD risk score (Eben MANTILLA, et al., 2019) is: 1%    No problem-specific Assessment & Plan notes found for this encounter.    Allergies: Patient has no known allergies.    Current Medications and Prescriptions Ordered in Epic[1]    Past Medical History[2]    Past Surgical History[3]    Social History[4]    Social History     Social History Narrative    Not on file       No family history on file.    ROS:     - Constitutional: Negative for fever, chills, unexpected weight change, and fatigue/generalized weakness.     - HEENT: Negative for headaches, vision changes, hearing changes, ear pain, ear discharge, rhinorrhea, sinus congestion, sore throat, and neck pain.      - Respiratory: Negative for cough, sputum production, chest congestion, dyspnea, wheezing, and crackles.      - Cardiovascular: Negative for chest pain, palpitations, orthopnea, and bilateral lower extremity edema.      - Neurological: Negative for dizziness, tingling, tremors, focal sensory deficit, focal weakness and headaches.         Health Maintenance  Cholesterol Screening: Fasting lipid panel  Diabetes Screening: Fasting blood sugar  Exercise: Regular exercise.   Substance Abuse: None  Safe in relationship Yes     Cancer screening  Colorectal Cancer Screening: N/A       Exam: /70   Pulse 87   Temp 36.9 °C (98.5 °F) (Temporal)   Resp 14   Ht 1.778 m (5' 10\")   Wt 76.2 kg (168 lb)   SpO2 97%  Body mass index is 24.11 kg/m².    General: Normal appearing. No distress.  HEENT: Normocephalic. Eyes conjunctiva clear lids without ptosis, pupils equal and reactive to light accommodation, ears normal shape and contour, canals are clear bilaterally, tympanic " membranes are benign, nasal mucosa benign, oropharynx is without erythema, edema or exudates.   Neck: Supple without JVD or bruit. No thyromegaly. No cervical lymphadenopathy  Pulmonary: Clear to ausculation.  Normal effort. No rales, ronchi, or wheezing.  Cardiovascular: Regular rate and rhythm without murmur, gallops or rubs  Neurologic: Grossly nonfocal, gait normal, normal muscle tone  Skin: Warm and dry.  No obvious lesions.  Musculoskeletal: No extremity cyanosis, clubbing, or edema. No deformity  Psych: Normal mood and affect. Alert and oriented x3. Judgment and insight is normal.    Please note that this dictation was created using voice recognition software. I have made every reasonable attempt to correct obvious errors, but I expect that there are errors of grammar and possibly content that I did not discover before finalizing the note.      Assessment/Plan    1. Physical exam, annual (Primary)  PE conducted  - CBC WITH DIFFERENTIAL; Future  - Comp Metabolic Panel; Future  - HEMOGLOBIN A1C; Future  - Lipid Profile; Future  - TSH WITH REFLEX TO FT4; Future    2. Encounter for hepatitis C screening test for low risk patient    - HEP C VIRUS ANTIBODY; Future    3. Screen for STD (sexually transmitted disease)    - HIV AG/AB COMBO ASSAY SCREENING; Future    4. Intractable migraine without aura and without status migrainosus  Continues to f/u with neurology         [1]   Current Outpatient Medications Ordered in Epic   Medication Sig Dispense Refill    Atogepant (QULIPTA) 60 MG Tab Take 1 Tablet by mouth every day. 30 Tablet 5    metoclopramide (REGLAN) 10 MG Tab Take 1-3 tablets at onset of headache/nausea.  May repeat in 4-6 hours as needed 45 Tablet 1    Cholecalciferol (VITAMIN D-3 PO) Take  by mouth.      Multiple Vitamin (MULTI-VITAMIN DAILY) TABS Take 1 Tablet by mouth every day.        SUMAtriptan (IMITREX) 50 MG Tab TAKE 1 TABLET BY MOUTH AT ONSET OF HEADACHE, MAY REPEAT DOSE IN 1 HOUR AS NEEDED  (Patient not taking: Reported on 7/31/2025) 9 Tablet 5    naproxen sodium (ANAPROX DS) 550 MG tablet Take 1-2 tablets by mouth at onset of pain.  Repeat once in 4 hour as needed for pain. (Patient not taking: Reported on 7/31/2025) 45 Tablet 1     Current Facility-Administered Medications Ordered in Epic   Medication Dose Route Frequency Provider Last Rate Last Admin    onabotulinum toxin A (Botox) injection 155 Units  155 Units Injection EVERY 12 WEEKS        [2]   Past Medical History:  Diagnosis Date    Head ache     Intractable migraine without aura     Chronic type Failed (SE): Topamax (cognitive) Failed (ineffective): Mg, Feverfew, B2   ICD-10 transition   [3]   Past Surgical History:  Procedure Laterality Date    APPENDECTOMY      2008   [4]   Social History  Tobacco Use    Smoking status: Never    Smokeless tobacco: Never   Vaping Use    Vaping status: Never Used   Substance Use Topics    Alcohol use: Not Currently     Comment: OCC    Drug use: No